# Patient Record
Sex: FEMALE | Race: WHITE | NOT HISPANIC OR LATINO | URBAN - METROPOLITAN AREA
[De-identification: names, ages, dates, MRNs, and addresses within clinical notes are randomized per-mention and may not be internally consistent; named-entity substitution may affect disease eponyms.]

---

## 2020-02-28 ENCOUNTER — OFFICE (OUTPATIENT)
Dept: URBAN - METROPOLITAN AREA CLINIC 33 | Facility: CLINIC | Age: 65
End: 2020-02-28

## 2020-02-28 ENCOUNTER — OFFICE (OUTPATIENT)
Dept: URBAN - METROPOLITAN AREA CLINIC 33 | Facility: CLINIC | Age: 65
End: 2020-02-28
Payer: COMMERCIAL

## 2020-02-28 VITALS
DIASTOLIC BLOOD PRESSURE: 71 MMHG | WEIGHT: 172.8 LBS | SYSTOLIC BLOOD PRESSURE: 97 MMHG | HEIGHT: 64 IN | HEART RATE: 72 BPM | TEMPERATURE: 96.8 F

## 2020-02-28 DIAGNOSIS — I25.10 ATHEROSCLEROTIC HEART DISEASE OF NATIVE CORONARY ARTERY WITH: ICD-10-CM

## 2020-02-28 DIAGNOSIS — Z86.010 PERSONAL HISTORY OF COLONIC POLYPS: ICD-10-CM

## 2020-02-28 DIAGNOSIS — K59.09 OTHER CONSTIPATION: ICD-10-CM

## 2020-02-28 DIAGNOSIS — G35 MULTIPLE SCLEROSIS: ICD-10-CM

## 2020-02-28 PROCEDURE — 99203 OFFICE O/P NEW LOW 30 MIN: CPT

## 2020-02-28 PROCEDURE — 00031: CPT

## 2020-02-28 NOTE — SERVICEHPINOTES
ELAINE MUNOZ   is a   64   year old    female who is being seen in consultation at the request of   SANCHEZ ALTMAN   for OV prior to colonoscopy.  Last colonoscopy in 2013 with 6mm adenomatous polyp ([place]). Overall, she is doing well. She is currently on Augagio for her MS (dx in 95). She still gets occasional reflux if she drinks too much coffee but otherwise none. BMs multiple times throughout the day, but small amts BSS type 4. Is on anti-inflammatory diet which seems to help. She does have some pelvic floor issues and went to pelvic floor PT maybe 8 years ago which did help. No family hx of colon cancer father has had multiple polyps. Son has UC. S/p MI in 2003 and has stent.  Had heart monitor and stress test last year which was reportedly normal. was released by cardiology. Denies chest pain, SOB, and palpitations.BRFONT style="BACKGROUND-COLOR: #ffffcc" visited="true"BRS/FONT

## 2020-06-23 ENCOUNTER — ON CAMPUS - OUTPATIENT (OUTPATIENT)
Dept: URBAN - METROPOLITAN AREA HOSPITAL 37 | Facility: HOSPITAL | Age: 65
End: 2020-06-23
Payer: COMMERCIAL

## 2020-06-23 DIAGNOSIS — D12.4 BENIGN NEOPLASM OF DESCENDING COLON: ICD-10-CM

## 2020-06-23 DIAGNOSIS — K63.5 POLYP OF COLON: ICD-10-CM

## 2020-06-23 DIAGNOSIS — Z86.010 PERSONAL HISTORY OF COLONIC POLYPS: ICD-10-CM

## 2020-06-23 PROCEDURE — 45380 COLONOSCOPY AND BIOPSY: CPT | Mod: PT | Performed by: INTERNAL MEDICINE

## 2021-03-04 ENCOUNTER — VIRTUAL VISIT (OUTPATIENT)
Dept: INTERNAL MEDICINE CLINIC | Age: 66
End: 2021-03-04
Payer: MEDICARE

## 2021-03-04 DIAGNOSIS — F33.41 RECURRENT MAJOR DEPRESSIVE DISORDER, IN PARTIAL REMISSION (HCC): ICD-10-CM

## 2021-03-04 DIAGNOSIS — N95.9 UNSPECIFIED MENOPAUSAL AND PERIMENOPAUSAL DISORDER: ICD-10-CM

## 2021-03-04 DIAGNOSIS — I25.119 ATHEROSCLEROSIS OF NATIVE CORONARY ARTERY OF NATIVE HEART WITH ANGINA PECTORIS (HCC): Primary | ICD-10-CM

## 2021-03-04 DIAGNOSIS — G35 MULTIPLE SCLEROSIS, RELAPSING-REMITTING (HCC): ICD-10-CM

## 2021-03-04 DIAGNOSIS — K21.9 GASTROESOPHAGEAL REFLUX DISEASE WITHOUT ESOPHAGITIS: ICD-10-CM

## 2021-03-04 DIAGNOSIS — Z80.49 FAMILY HISTORY OF CERVICAL CANCER: ICD-10-CM

## 2021-03-04 DIAGNOSIS — J45.20 MILD INTERMITTENT ASTHMA WITHOUT COMPLICATION: ICD-10-CM

## 2021-03-04 DIAGNOSIS — F41.9 ANXIETY: ICD-10-CM

## 2021-03-04 DIAGNOSIS — Z12.31 BREAST CANCER SCREENING BY MAMMOGRAM: ICD-10-CM

## 2021-03-04 DIAGNOSIS — E78.5 HYPERLIPIDEMIA, UNSPECIFIED HYPERLIPIDEMIA TYPE: ICD-10-CM

## 2021-03-04 DIAGNOSIS — Z13.820 SCREENING FOR OSTEOPOROSIS: ICD-10-CM

## 2021-03-04 DIAGNOSIS — Z80.3 FAMILY HISTORY OF BREAST CANCER: ICD-10-CM

## 2021-03-04 DIAGNOSIS — K58.9 IRRITABLE BOWEL SYNDROME WITHOUT DIARRHEA: ICD-10-CM

## 2021-03-04 PROBLEM — I25.10 ATHEROSCLEROSIS OF CORONARY ARTERY: Status: ACTIVE | Noted: 2017-10-23

## 2021-03-04 PROBLEM — G43.909 MIGRAINE HEADACHE: Status: ACTIVE | Noted: 2021-03-04

## 2021-03-04 PROBLEM — E66.3 OVERWEIGHT: Status: ACTIVE | Noted: 2020-09-23

## 2021-03-04 PROBLEM — F32.A DEPRESSION: Status: ACTIVE | Noted: 2017-10-23

## 2021-03-04 PROCEDURE — 3017F COLORECTAL CA SCREEN DOC REV: CPT | Performed by: PHYSICIAN ASSISTANT

## 2021-03-04 PROCEDURE — G9899 SCRN MAM PERF RSLTS DOC: HCPCS | Performed by: PHYSICIAN ASSISTANT

## 2021-03-04 PROCEDURE — 99999 PR OFFICE/OUTPT VISIT,PROCEDURE ONLY: CPT | Performed by: PHYSICIAN ASSISTANT

## 2021-03-04 PROCEDURE — G8427 DOCREV CUR MEDS BY ELIG CLIN: HCPCS | Performed by: PHYSICIAN ASSISTANT

## 2021-03-04 PROCEDURE — G9717 DOC PT DX DEP/BP F/U NT REQ: HCPCS | Performed by: PHYSICIAN ASSISTANT

## 2021-03-04 PROCEDURE — 99204 OFFICE O/P NEW MOD 45 MIN: CPT | Performed by: PHYSICIAN ASSISTANT

## 2021-03-04 RX ORDER — MONTELUKAST SODIUM 10 MG/1
TABLET ORAL
COMMUNITY
Start: 2020-12-28 | End: 2021-03-29 | Stop reason: SDUPTHER

## 2021-03-04 RX ORDER — ATORVASTATIN CALCIUM 40 MG/1
40 TABLET, FILM COATED ORAL DAILY
COMMUNITY
Start: 2020-09-23 | End: 2021-08-20 | Stop reason: DRUGHIGH

## 2021-03-04 RX ORDER — ALBUTEROL SULFATE 0.83 MG/ML
2.5 SOLUTION RESPIRATORY (INHALATION)
COMMUNITY
Start: 2020-03-23 | End: 2021-03-23

## 2021-03-04 RX ORDER — CITALOPRAM 20 MG/1
20 TABLET, FILM COATED ORAL DAILY
COMMUNITY
Start: 2020-09-23 | End: 2021-08-27 | Stop reason: SDUPTHER

## 2021-03-04 RX ORDER — BUPROPION HYDROCHLORIDE 300 MG/1
300 TABLET ORAL EVERY MORNING
COMMUNITY
Start: 2020-09-23 | End: 2021-06-04 | Stop reason: SDUPTHER

## 2021-03-04 RX ORDER — MOMETASONE FUROATE 100 UG/1
AEROSOL RESPIRATORY (INHALATION)
COMMUNITY
Start: 2021-02-23

## 2021-03-04 RX ORDER — FLUTICASONE PROPIONATE 50 MCG
SPRAY, SUSPENSION (ML) NASAL
COMMUNITY
Start: 2020-04-03 | End: 2021-06-04 | Stop reason: SDUPTHER

## 2021-03-04 NOTE — PROGRESS NOTES
Channing Qiu  Identified pt with two pt identifiers(name and ). Chief Complaint   Patient presents with   [address]     Former PCP - Dr. Ha Blas record In preparation for visit and have obtained necessary documentation. 1. Have you been to the ER, urgent care clinic or hospitalized since your last visit? Yes. [facility]     2. Have you seen or consulted any other health care providers outside of the [address] since your last visit? Include any pap smears or colon screening. Yes. PAP - Dr. Yuliya Law on 2020 [id number]  [facility]  Colonoscopy - [facility] in [place], [place]     Patient has an advance directive. Vitals reviewed with provider. Health Maintenance reviewed:     Health Maintenance Due   Topic    Hepatitis C Screening     Lipid Screen     PAP AKA CERVICAL CYTOLOGY     Shingrix Vaccine Age 49> (1 of 2)    Colorectal Cancer Screening Combo     Breast Cancer Screen Mammogram           Wt Readings from Last 3 Encounters:   No data found for Wt        Temp Readings from Last 3 Encounters:   No data found for Temp        BP Readings from Last 3 Encounters:   No data found for BP        Pulse Readings from Last 3 Encounters:   No data found for Pulse      There were no vitals filed for this visit.        Learning Assessment:   :       Learning Assessment 3/4/2021   PRIMARY LEARNER Patient   HIGHEST LEVEL OF EDUCATION - PRIMARY LEARNER  2 YEARS OF COLLEGE   BARRIERS PRIMARY LEARNER NONE   PRIMARY LANGUAGE ENGLISH   LEARNER PREFERENCE PRIMARY READING   ANSWERED BY Patient   RELATIONSHIP SELF        Depression Screening:   :       3 most recent PHQ Screens 3/4/2021   Little interest or pleasure in doing things Nearly every day   Feeling down, depressed, irritable, or hopeless Nearly every day   Total Score PHQ 2 6        Fall Risk Assessment:   :       Fall Risk Assessment, last 12 mths 3/4/2021   Able to walk? Yes   Fall in past 12 months? 0        Abuse Screening:   :       Abuse Screening Questionnaire 3/4/2021   Do you ever feel afraid of your partner? N   Are you in a relationship with someone who physically or mentally threatens you? N   Is it safe for you to go home?  Y        ADL Screening:   :       ADL Assessment 3/4/2021   Feeding yourself No Help Needed   Getting from bed to chair No Help Needed   Getting dressed No Help Needed   Bathing or showering No Help Needed   Walk across the room (includes cane/walker) No Help Needed   Using the telphone No Help Needed   Taking your medications No Help Needed   Preparing meals No Help Needed   Managing money (expenses/bills) No Help Needed   Moderately strenuous housework (laundry) No Help Needed   Shopping for personal items (toiletries/medicines) No Help Needed   Shopping for groceries No Help Needed   Driving No Help Needed   Climbing a flight of stairs No Help Needed   Getting to places beyond walking distances No Help Needed

## 2021-03-04 NOTE — PROGRESS NOTES
Alta Zepeda is a 60 y.o. female who was seen by synchronous (real-time) audio-video technology on 3/4/2021 for Establish Care (Former PCP - Dr. Yo Barnett) and Medication Refill        Assessment & Plan:   Diagnoses and all orders for this visit:    1. Atherosclerosis of native coronary artery of native heart with angina pectoris ([facility] [address]  -     LIPID PANEL; Future  -     VITAMIN D, 25 HYDROXY; Future  -     TSH 3RD GENERATION; Future  -     METABOLIC PANEL, COMPREHENSIVE; Future  No longer followed by Cardiology. Hx of 1 Drug alluding stent placed. No angina. On Statin with good LDL control  2. Gastroesophageal reflux disease without esophagitis  Diet Controlled  3. Irritable bowel syndrome without diarrhea  -     TSH 3RD GENERATION; Future  -     METABOLIC PANEL, COMPREHENSIVE; Future  High fiber diet, controlled. Hx of hemorrhoids secondary to constipation  4. [facility] sclerosis, rel[facility]remitting [facility]   Followed by Neurology. MRI twice yearly. Last relapse 2010. Takes Aubagio daily for control. Has chronic symptoms of muscle weakness from last relapse  5. Anxiety  -     TSH 3RD GENERATION; Future  -     METABOLIC PANEL, COMPREHENSIVE; Future   Well controlled at this time. Improving with more outdoor activities and friends in this area from her move. 6. Recurrent major depressive disorder, in partial remission ([facility] [address]  -     TSH 3RD GENERATION; Future  -     METABOLIC PANEL, COMPREHENSIVE; Future   Hx of violent trauma in her past with PTSD. Has seen counseling and therapy for this and has done well. Had bad few weeks of depression when weather turned, but now improvng.   7. Family history of breast cancer  Mother, gets yearly mammograms  8. Family history of cervical cancer   Mother. Last pap 2020, normal  9. Hyperlipidemia, unspecified hyperlipidemia type  -     LIPID PANEL; Future  -     METABOLIC PANEL, COMPREHENSIVE; Future  Last LDL Feb 2020, at [place];  10.  Breast cancer screening by mammogram  -     [facility] FADIA W MAMMO BI SCREENING INCL CAD; Future    11. Screening for osteoporosis  -     DEXA BONE DENSITY STUDY AXIAL; Future  -     VITAMIN D, 25 HYDROXY; Future   Last done in 2005 or so per pt   12. Unspecified menopausal and perimenopausal disorder   -     DEXA BONE DENSITY STUDY AXIAL; Future  -     VITAMIN D, 25 HYDROXY; Future        I spent at least 50 minutes on this visit with this new patient. 712  Subjective:     Has a hx of HLD. Hx of MI in 2003. Had 98% blockage in LAD. Had 1 stent placed. Has not needed nitro since 2003. Has been released from Cardiologist.  Last LDL: 98 in February 2020. HDL 53. Triglyceride   Normal BP's     Hx of migraine headaches. None since having heart attack. Hx of Multiple Sclerosis. Diagnosis in 1995. Relapsing-remitting. Was in a clinical trial in [facility]. Had been in remission since 2010. See's Dr. Juan José Lombardi [facility]) see's him every 3 months. Gets MRI every 6 months. Takes Aubagio. R arm and R hand and R lower face have minimal weakness. L leg is significantly weaker than R. Neuropathy in both hands. All gets worse if over exerted. Hx of asthma. Asthmanex. Has not needed nebulizer. Tends to seasonal. Grass, mold, tree pollen triggers. Never hospitalized. Bad 2020. Hx of major depression. Worst in winter     Mother had hx of cervical cancer, breast cancer, lung cancer. Had pap smear in 2020. Normal.  Hx of dense breast. Mammogram 2020 Jan.    Colonoscopy in 2020. Hx of polyps. Benign, adenoma polyps. Due in 5 years. Hx of IBS-C. Use high fiber diet. GERD: Diet controlled. Acts up if not eating right. Has a hx of recurring hemorrhoid. Needed to have surgery in 2009. Diet:  Eats by auto-immune protocol. Avoids legumes and soy. Fiber supplement. Exercise: Walks almost 2 miles a day. Prior to Admission medications    Medication Sig Start Date End Date Taking?  Authorizing Provider   citalopram (CELEXA) 20 mg tablet Take 20 mg by mouth daily. 9/23/20  Yes Provider, Historical   buPROPion XL (WELLBUTRIN XL) 300 mg XL tablet Take 300 mg by mouth Every morning. 9/23/20  Yes Provider, Historical   montelukast (SINGULAIR) 10 mg tablet TAKE 1 TABLET BY MOUTH ONCE DAILY 12/28/20  Yes Provider, Historical   atorvastatin (LIPITOR) 40 mg tablet Take 40 mg by mouth daily. 9/23/20  Yes Provider, Historical   fluticasone propionate (FLONASE) 50 mcg/actuation nasal spray SPRAY 2 SPRAYS INTO EACH NOSTRIL EVERY DAY 4/3/20  Yes Provider, Historical   Asmanex  mcg/actuation HFAA inhaler INHALE 1 PUFF BY MOUTH TWICE DAILY 2/23/21  Yes Provider, Historical   albuterol (PROVENTIL VENTOLIN) 2.5 mg /3 mL (0.083 %) nebu 2.5 mg every six (6) hours as needed. 3/23/20 3/23/21 Yes Provider, Historical   teriflunomide (AUBAGIO) 14 mg tablet Take 1 Tab by mouth daily.    Yes Provider, Historical     Patient Active Problem List   Diagnosis Code    Anxiety F41.9    Atherosclerosis of coronary artery I25.10    Depression F32.9    Family history of breast cancer Z80.3    Family history of cervical cancer Z80.49    GERD (gastroesophageal reflux disease) K21.9    IBS (irritable bowel syndrome) K58.9    Migraine headache G43.909    Hyperlipidemia E78.5    Multiple sclerosis, relapsing-remitting ([facility] G35    Overweight E66.3    Recurrent major depressive disorder, in partial remission ([facility] F33.41     Patient Active Problem List    Diagnosis Date Noted    Migraine headache 03/04/2021    Hyperlipidemia 03/04/2021    GERD (gastroesophageal reflux disease) 10/05/2020    Overweight 09/23/2020    Family history of breast cancer 02/19/2020    Family history of cervical cancer 12/18/2018    Recurrent major depressive disorder, in partial remission ([facility] 12/02/2018    Anxiety 10/23/2017    Atherosclerosis of coronary artery 10/23/2017    Depression 10/23/2017    IBS (irritable bowel syndrome) 10/23/2017    [facility], [facility]-remitting [facility] 10/23/2017     Current Outpatient Medications   Medication Sig Dispense Refill    citalopram (CELEXA) 20 mg tablet Take 20 mg by mouth daily.  buPROPion XL (WELLBUTRIN XL) 300 mg XL tablet Take 300 mg by mouth Every morning.  montelukast (SINGULAIR) 10 mg tablet TAKE 1 TABLET BY MOUTH ONCE DAILY      atorvastatin (LIPITOR) 40 mg tablet Take 40 mg by mouth daily.  fluticasone propionate (FLONASE) 50 mcg/actuation nasal spray SPRAY 2 SPRAYS INTO EACH NOSTRIL EVERY DAY      Asmanex  mcg/actuation HFAA inhaler INHALE 1 PUFF BY MOUTH TWICE DAILY      albuterol (PROVENTIL VENTOLIN) 2.5 mg /3 mL (0.083 %) nebu 2.5 mg every six (6) hours as needed.  teriflunomide (AUBAGIO) 14 mg tablet Take 1 Tab by mouth daily. Allergies   Allergen Reactions    Opioids-Methadone And Related Other (comments)     Highly sensitive     History reviewed. No pertinent past medical history. History reviewed. No pertinent surgical history. Family History   Problem Relation Age of Onset    Cancer Mother         Cervical, Lung and Breast    Heart Disease Father      Social History     Tobacco Use    Smoking status: Former Smoker     Types: Cigarettes    Smokeless tobacco: Never Used   Substance Use Topics    Alcohol use: Not Currently     Frequency: Never       ROS    Objective:     Patient-Reported Vitals 3/4/2021   Patient-Reported Weight 170lb      General: alert, cooperative, no distress   Mental  status: normal mood, behavior, speech, dress, motor activity, and thought processes, able to follow commands   HENT: NCAT   Neck: no visualized mass   Resp: no respiratory distress   Neuro: no gross deficits   Skin: no discoloration or lesions of concern on visible areas   Psychiatric: normal affect, consistent with stated mood, no evidence of hallucinations     Additional exam findings:        We discussed the expected course, resolution and complications of the diagnosis(es) in detail. Medication risks, benefits, costs, interactions, and alternatives were discussed as indicated. I advised her to contact the office if her condition worsens, changes or fails to improve as anticipated. She expressed understanding with the diagnosis(es) and plan. Corrie Oj, was evaluated through a synchronous (real-time) audio-video encounter. The patient (or guardian if applicable) is aware that this is a billable service. Verbal consent to proceed has been obtained within the past 12 months. The visit was conducted pursuant to the emergency declaration under the [address] [place] [address], [address] [place] [address] authority and the [employer] General Act. Patient identification was verified, and a caregiver was present when appropriate. The patient was located in a state where the provider was credentialed to provide care.     Gina Washington PA-C

## 2021-03-08 ENCOUNTER — HOSPITAL ENCOUNTER (OUTPATIENT)
Dept: MAMMOGRAPHY | Age: 66
Discharge: HOME | End: 2021-03-08
Attending: PHYSICIAN ASSISTANT
Payer: MEDICARE

## 2021-03-08 ENCOUNTER — HOSPITAL ENCOUNTER (OUTPATIENT)
Dept: BONE DENSITY | Age: 66
Discharge: HOME | End: 2021-03-08
Attending: PHYSICIAN ASSISTANT
Payer: MEDICARE

## 2021-03-08 DIAGNOSIS — M85.80 OSTEOPENIA AFTER MENOPAUSE: Primary | ICD-10-CM

## 2021-03-08 DIAGNOSIS — Z12.31 BREAST CANCER SCREENING BY MAMMOGRAM: ICD-10-CM

## 2021-03-08 DIAGNOSIS — N95.9 UNSPECIFIED MENOPAUSAL AND PERIMENOPAUSAL DISORDER: ICD-10-CM

## 2021-03-08 DIAGNOSIS — Z78.0 OSTEOPENIA AFTER MENOPAUSE: Primary | ICD-10-CM

## 2021-03-08 DIAGNOSIS — Z13.820 SCREENING FOR OSTEOPOROSIS: ICD-10-CM

## 2021-03-08 PROCEDURE — 77063 BREAST TOMOSYNTHESIS BI: CPT

## 2021-03-08 PROCEDURE — 77080 DXA BONE DENSITY AXIAL: CPT

## 2021-03-08 RX ORDER — SPIRONOLACTONE 25 MG
1 TABLET ORAL DAILY
Qty: 90 TAB | Refills: 3 | Status: SHIPPED | OUTPATIENT
Start: 2021-03-08

## 2021-03-08 NOTE — PROGRESS NOTES
Verified two patient identifiers, name and . Pt notified of results, she uses the [facility] in [place] on [address]. Pt had no further questions at this time.

## 2021-03-08 NOTE — PROGRESS NOTES
Ryan Dodson,   Your DEXA scan showed diffuse osteopenia. This is not total bone density loss but it is the start of it and if unchecked can lead to osteoporosis. I want you to begin taking a vitamin D and calcium supplement every day. I will call in the one to your pharmacy you should take.

## 2021-03-29 RX ORDER — MONTELUKAST SODIUM 10 MG/1
TABLET ORAL
Qty: 90 TAB | Refills: 3 | Status: SHIPPED | OUTPATIENT
Start: 2021-03-29 | End: 2021-10-21

## 2021-03-29 NOTE — TELEPHONE ENCOUNTER
Requested Prescriptions     Pending Prescriptions Disp Refills    montelukast (SINGULAIR) 10 mg tablet

## 2021-03-29 NOTE — TELEPHONE ENCOUNTER
PCP: Mary Douglas PA-C     Last appt: 3/4/2021   No future appointments.      Requested Prescriptions     Pending Prescriptions Disp Refills    montelukast (SINGULAIR) 10 mg tablet 90 Tab 3     Sig: TAKE 1 TABLET BY MOUTH ONCE DAILY

## 2021-04-13 LAB
25(OH)D3+25(OH)D2 SERPL-MCNC: 48.7 NG/ML (ref 30–100)
ALBUMIN SERPL-MCNC: 4.4 G/DL (ref 3.8–4.8)
ALBUMIN/GLOB SERPL: 2 {RATIO} (ref 1.2–2.2)
ALP SERPL-CCNC: 114 IU/L (ref 39–117)
ALT SERPL-CCNC: 20 IU/L (ref 0–32)
AST SERPL-CCNC: 19 IU/L (ref 0–40)
BILIRUB SERPL-MCNC: 0.4 MG/DL (ref 0–1.2)
BUN SERPL-MCNC: 10 MG/DL (ref 8–27)
BUN/CREAT SERPL: 14 (ref 12–28)
CALCIUM SERPL-MCNC: 9.8 MG/DL (ref 8.7–10.3)
CHLORIDE SERPL-SCNC: 103 MMOL/L (ref 96–106)
CHOLEST SERPL-MCNC: 149 MG/DL (ref 100–199)
CO2 SERPL-SCNC: 23 MMOL/L (ref 20–29)
CREAT SERPL-MCNC: 0.69 MG/DL (ref 0.57–1)
GLOBULIN SER CALC-MCNC: 2.2 G/DL (ref 1.5–4.5)
GLUCOSE SERPL-MCNC: 81 MG/DL (ref 65–99)
HDLC SERPL-MCNC: 51 MG/DL
LDLC SERPL CALC-MCNC: 78 MG/DL (ref 0–99)
POTASSIUM SERPL-SCNC: 4.4 MMOL/L (ref 3.5–5.2)
PROT SERPL-MCNC: 6.6 G/DL (ref 6–8.5)
SODIUM SERPL-SCNC: 140 MMOL/L (ref 134–144)
TRIGL SERPL-MCNC: 112 MG/DL (ref 0–149)
TSH SERPL DL<=0.005 MIU/L-ACNC: 3.41 UIU/ML (ref 0.45–4.5)
VLDLC SERPL CALC-MCNC: 20 MG/DL (ref 5–40)

## 2021-06-04 RX ORDER — BUPROPION HYDROCHLORIDE 300 MG/1
300 TABLET ORAL EVERY MORNING
Qty: 30 TABLET | Refills: 1 | Status: SHIPPED | OUTPATIENT
Start: 2021-06-04 | End: 2021-08-20 | Stop reason: SDUPTHER

## 2021-06-04 RX ORDER — FLUTICASONE PROPIONATE 50 MCG
SPRAY, SUSPENSION (ML) NASAL
Qty: 1 BOTTLE | Refills: 1 | Status: SHIPPED | OUTPATIENT
Start: 2021-06-04 | End: 2022-05-23

## 2021-06-04 NOTE — TELEPHONE ENCOUNTER
Requested Prescriptions     Pending Prescriptions Disp Refills    fluticasone propionate (FLONASE) 50 mcg/actuation nasal spray 1 Bottle

## 2021-06-04 NOTE — TELEPHONE ENCOUNTER
Requested Prescriptions     Pending Prescriptions Disp Refills    fluticasone propionate (FLONASE) 50 mcg/actuation nasal spray 1 Bottle     buPROPion XL (WELLBUTRIN XL) 300 mg XL tablet       Sig: Take 1 Tablet by mouth Every morning.

## 2021-06-04 NOTE — TELEPHONE ENCOUNTER
PCP: Adal Livingston PA-C     Last appt: 3/4/2021   No future appointments. Requested Prescriptions     Pending Prescriptions Disp Refills    fluticasone propionate (FLONASE) 50 mcg/actuation nasal spray 1 Bottle     buPROPion XL (WELLBUTRIN XL) 300 mg XL tablet       Sig: Take 1 Tablet by mouth Every morning.

## 2021-08-19 NOTE — PATIENT INSTRUCTIONS
Medicare Wellness Visit, Female     The best way to live healthy is to have a lifestyle where you eat a well-balanced diet, exercise regularly, limit alcohol use, and quit all forms of tobacco/nicotine, if applicable. Regular preventive services are another way to keep healthy. Preventive services (vaccines, screening tests, monitoring & exams) can help personalize your care plan, which helps you manage your own care. Screening tests can find health problems at the earliest stages, when they are easiest to treat. [facility] follows the current, evidence-based guidelines published by the [facility] ([facility]) when recommending preventive services for our patients. Because we follow these guidelines, sometimes recommendations change over time as research supports it. (For example, mammograms used to be recommended annually. Even though Medicare will still pay for an annual mammogram, the newer guidelines recommend a mammogram every two years for women of average risk). Of course, you and your doctor may decide to screen more often for some diseases, based on your risk and your co-morbidities (chronic disease you are already diagnosed with). Preventive services for you include:  - Medicare offers their members a free annual wellness visit, which is time for you and your primary care provider to discuss and plan for your preventive service needs. Take advantage of this benefit every year!  -All adults over the age of 72 should receive the recommended pneumonia vaccines. Current USPSTF guidelines recommend a series of two vaccines for the best pneumonia protection.   -All adults should have a flu vaccine yearly and a tetanus vaccine every 10 years.   -All adults age 48 and older should receive the shingles vaccines (series of two vaccines).       -All adults age 38-68 who are overweight should have a diabetes screening test once every three years.   -All adults born between [birth year] and 1964 should be screened once for Hepatitis C.  -Other screening tests and preventive services for persons with diabetes include: an eye exam to screen for diabetic retinopathy, a kidney function test, a foot exam, and stricter control over your cholesterol.   -Cardiovascular screening for adults with routine risk involves an electrocardiogram (ECG) at intervals determined by your doctor.   -Colorectal cancer screenings should be done for adults age 54-65 with no increased risk factors for colorectal cancer. There are a number of acceptable methods of screening for this type of cancer. Each test has its own benefits and drawbacks. Discuss with your doctor what is most appropriate for you during your annual wellness visit. The different tests include: colonoscopy (considered the best screening method), a fecal occult blood test, a fecal DNA test, and sigmoidoscopy.    -A bone mass density test is recommended when a woman turns 65 to screen for osteoporosis. This test is only recommended one time, as a screening. Some providers will use this same test as a disease monitoring tool if you already have osteoporosis. -Breast cancer screenings are recommended every other year for women of normal risk, age 54-69.  -Cervical cancer screenings for women over age 72 are only recommended with certain risk factors.      Here is a list of your current Health Maintenance items (your personalized list of preventive services) with a due date:  Health Maintenance Due   Topic Date Due    Hepatitis C Test  Never done    Pap Test  Never done    Colorectal Screening  Never done    Annual Well Visit  Never done

## 2021-08-19 NOTE — PROGRESS NOTES
This is the Subsequent Medicare Annual Wellness Exam, performed 12 months or more after the Initial AWV or the last Subsequent AWV    I have reviewed the patient's medical history in detail and updated the computerized patient record. Assessment/Plan   Education and counseling provided:  Are appropriate based on today's review and evaluation  End-of-Life planning (with patient's consent)    1. Medicare annual wellness visit, subsequent       Depression Risk Factor Screening     3 most recent PHQ Screens 3/4/2021   Little interest or pleasure in doing things Nearly every day   Feeling down, depressed, irritable, or hopeless Nearly every day   Total Score PHQ 2 6       Alcohol Risk Screen    Do you average more than 1 drink per night or more than 7 drinks a week:  No    On any one occasion in the past three months have you have had more than 3 drinks containing alcohol:  No        Functional Ability and Level of Safety    Hearing: Has had hearing evaluation last year, L ear with some hearing loss, within norm      Activities of Daily Living: The home contains: handrails, grab bars and rugs  Patient does total self care      Ambulation: with difficulty, uses a cane     Fall Risk:  Fall Risk Assessment, last 12 mths 3/4/2021   Able to walk? Yes   Fall in past 12 months?  0      Abuse Screen:  Patient is not abused       Cognitive Screening    Has your family/caregiver stated any concerns about your memory: no     Cognitive Screening: Normal - Animal Naming Test    Health Maintenance Due     Health Maintenance Due   Topic Date Due    Hepatitis C Screening  Never done    PAP AKA CERVICAL CYTOLOGY  Never done    Colorectal Cancer Screening Combo  Never done    Medicare Yearly Exam  Never done       Patient Care Team   Patient Care Team:  Sruthi Osborne PA-C as PCP - General (Physician Assistant)  Sruthi Osborne PA-C as PCP - [facility] Erickson Henry Provider    History     Patient Active Problem List   Diagnosis Code    Anxiety F41.9    Atherosclerosis of coronary artery I25.10    Depression F32.9    Family history of breast cancer Z80.3    Family history of cervical cancer Z80.49    GERD (gastroesophageal reflux disease) K21.9    IBS (irritable bowel syndrome) K58.9    Migraine headache G43.909    Hyperlipidemia E78.5    Multiple sclerosis, relapsing-remitting (HCC) G35    Overweight E66.3    Recurrent major depressive disorder, in partial remission (HCC) F33.41    Mild intermittent asthma without complication F11.72     No past medical history on file. No past surgical history on file. Current Outpatient Medications   Medication Sig Dispense Refill    fluticasone propionate (FLONASE) 50 mcg/actuation nasal spray SPRAY 2 SPRAYS INTO EACH NOSTRIL EVERY DAY 1 Bottle 1    buPROPion XL (WELLBUTRIN XL) 300 mg XL tablet Take 1 Tablet by mouth Every morning. 30 Tablet 1    montelukast (SINGULAIR) 10 mg tablet TAKE 1 TABLET BY MOUTH ONCE DAILY 90 Tab 3    Calcium-Cholecalciferol, D3, (Calcium 600 with Vitamin D3) 600 mg(1,500mg) -400 unit chew Take 1 Tab by mouth daily. 90 Tab 3    citalopram (CELEXA) 20 mg tablet Take 20 mg by mouth daily.  atorvastatin (LIPITOR) 40 mg tablet Take 40 mg by mouth daily.  Asmanex  mcg/actuation HFAA inhaler INHALE 1 PUFF BY MOUTH TWICE DAILY      teriflunomide (AUBAGIO) 14 mg tablet Take 1 Tab by mouth daily.        Allergies   Allergen Reactions    Opioids-Methadone And Related Other (comments)     Highly sensitive       Family History   Problem Relation Age of Onset    Cancer Mother         Cervical, Lung and Breast    Breast Cancer Mother     Heart Disease Father      Social History     Tobacco Use    Smoking status: Former Smoker     Types: Cigarettes    Smokeless tobacco: Never Used   Substance Use Topics    Alcohol use: Not Currently         Lauren Son PA-C

## 2021-08-20 ENCOUNTER — OFFICE VISIT (OUTPATIENT)
Dept: INTERNAL MEDICINE CLINIC | Age: 66
End: 2021-08-20
Payer: MEDICARE

## 2021-08-20 VITALS
HEART RATE: 77 BPM | TEMPERATURE: 98.4 F | SYSTOLIC BLOOD PRESSURE: 110 MMHG | DIASTOLIC BLOOD PRESSURE: 75 MMHG | RESPIRATION RATE: 12 BRPM | BODY MASS INDEX: 29.19 KG/M2 | WEIGHT: 171 LBS | HEIGHT: 64 IN | OXYGEN SATURATION: 95 %

## 2021-08-20 DIAGNOSIS — F33.41 RECURRENT MAJOR DEPRESSIVE DISORDER, IN PARTIAL REMISSION (HCC): ICD-10-CM

## 2021-08-20 DIAGNOSIS — I25.10 ATHEROSCLEROSIS OF NATIVE CORONARY ARTERY OF NATIVE HEART WITHOUT ANGINA PECTORIS: ICD-10-CM

## 2021-08-20 DIAGNOSIS — E66.3 OVERWEIGHT: Primary | ICD-10-CM

## 2021-08-20 DIAGNOSIS — G35 MULTIPLE SCLEROSIS, RELAPSING-REMITTING (HCC): ICD-10-CM

## 2021-08-20 DIAGNOSIS — Z00.00 MEDICARE ANNUAL WELLNESS VISIT, SUBSEQUENT: Primary | ICD-10-CM

## 2021-08-20 DIAGNOSIS — J45.20 MILD INTERMITTENT ASTHMA WITHOUT COMPLICATION: ICD-10-CM

## 2021-08-20 PROCEDURE — G9899 SCRN MAM PERF RSLTS DOC: HCPCS | Performed by: PHYSICIAN ASSISTANT

## 2021-08-20 PROCEDURE — G8419 CALC BMI OUT NRM PARAM NOF/U: HCPCS | Performed by: PHYSICIAN ASSISTANT

## 2021-08-20 PROCEDURE — G0439 PPPS, SUBSEQ VISIT: HCPCS | Performed by: PHYSICIAN ASSISTANT

## 2021-08-20 PROCEDURE — 3017F COLORECTAL CA SCREEN DOC REV: CPT | Performed by: PHYSICIAN ASSISTANT

## 2021-08-20 PROCEDURE — G8427 DOCREV CUR MEDS BY ELIG CLIN: HCPCS | Performed by: PHYSICIAN ASSISTANT

## 2021-08-20 PROCEDURE — G9717 DOC PT DX DEP/BP F/U NT REQ: HCPCS | Performed by: PHYSICIAN ASSISTANT

## 2021-08-20 RX ORDER — PROMETHAZINE HYDROCHLORIDE 25 MG/1
25 TABLET ORAL
Qty: 30 TABLET | Refills: 2 | Status: SHIPPED | OUTPATIENT
Start: 2021-08-20 | End: 2022-07-06

## 2021-08-20 RX ORDER — LEVOCETIRIZINE DIHYDROCHLORIDE 5 MG/1
TABLET, FILM COATED ORAL
COMMUNITY
End: 2022-06-07

## 2021-08-20 RX ORDER — MECLIZINE HYDROCHLORIDE 25 MG/1
12.5 TABLET ORAL
Qty: 30 TABLET | Refills: 0 | Status: SHIPPED | OUTPATIENT
Start: 2021-08-20 | End: 2021-09-09

## 2021-08-20 RX ORDER — MULTIVITAMIN WITH IRON
1 TABLET ORAL DAILY
COMMUNITY

## 2021-08-20 RX ORDER — BUPROPION HYDROCHLORIDE 300 MG/1
300 TABLET ORAL EVERY MORNING
Qty: 90 TABLET | Refills: 3 | Status: SHIPPED | OUTPATIENT
Start: 2021-08-20

## 2021-08-20 RX ORDER — ATORVASTATIN CALCIUM 20 MG/1
20 TABLET, FILM COATED ORAL DAILY
Qty: 90 TABLET | Refills: 3 | Status: SHIPPED | OUTPATIENT
Start: 2021-08-20 | End: 2022-09-06 | Stop reason: SDUPTHER

## 2021-08-20 NOTE — PROGRESS NOTES
Frank Saenz  Identified pt with two pt identifiers(name and ). Chief Complaint   Patient presents with   [facility] Wellness Visit       Reviewed record In preparation for visit and have obtained necessary documentation. Will bring a copy of advanced directive / living will. 1. Have you been to the ER, urgent care clinic or hospitalized since your last visit? No     2. Have you seen or consulted any other health care providers outside of the [address] [place] [address] since your last visit? Include any pap smears or colon screening. No    Vitals reviewed with provider. Health Maintenance reviewed: colonoscopy Dr Alfonso Machado in [address] [place] [address], has had a pap will call back with name. Health Maintenance Due   Topic    Hepatitis C Screening     PAP AKA CERVICAL CYTOLOGY     Colorectal Cancer Screening Combo     Medicare Yearly Exam           Wt Readings from Last 3 Encounters:   No data found for Wt        Temp Readings from Last 3 Encounters:   No data found for Temp        BP Readings from Last 3 Encounters:   No data found for BP        Pulse Readings from Last 3 Encounters:   No data found for Pulse      There were no vitals filed for this visit. Learning Assessment:   :       Learning Assessment 3/4/2021   PRIMARY LEARNER Patient   HIGHEST LEVEL OF EDUCATION - PRIMARY LEARNER  2 YEARS OF COLLEGE   BARRIERS PRIMARY LEARNER NONE   PRIMARY LANGUAGE ENGLISH   LEARNER PREFERENCE PRIMARY READING   ANSWERED BY Patient   RELATIONSHIP SELF        Depression Screening:   :       3 most recent PHQ Screens 2021   PHQ Not Done Active Diagnosis of Depression or Bipolar Disorder   Little interest or pleasure in doing things -   Feeling down, depressed, irritable, or hopeless -   Total Score PHQ 2 -        Fall Risk Assessment:   :       Fall Risk Assessment, last 12 mths 3/4/2021   Able to walk? Yes   Fall in past 12 months?  0        Abuse Screening:   :       Abuse Screening Questionnaire 3/4/2021   Do you ever feel afraid of your partner? N   Are you in a relationship with someone who physically or mentally threatens you? N   Is it safe for you to go home?  Y        ADL Screening:   :       ADL Assessment 8/20/2021   Feeding yourself No Help Needed   Getting from bed to chair No Help Needed   Getting dressed No Help Needed   Bathing or showering No Help Needed   Walk across the room (includes cane/walker) No Help Needed   Using the telphone No Help Needed   Taking your medications No Help Needed   Preparing meals No Help Needed   Managing money (expenses/bills) No Help Needed   Moderately strenuous housework (laundry) No Help Needed   Shopping for personal items (toiletries/medicines) No Help Needed   Shopping for groceries No Help Needed   Driving No Help Needed   Climbing a flight of stairs No Help Needed   Getting to places beyond walking distances No Help Needed

## 2021-08-20 NOTE — TELEPHONE ENCOUNTER
PCP: Jos Powell PA-C    Last appt: 8/20/2021  Future Appointments   Date Time Provider Clinton Meeks   2/22/2022  8:00 AM Jos Powell PA-C [facility] AMB       Requested Prescriptions     Pending Prescriptions Disp Refills    buPROPion XL (WELLBUTRIN XL) 300 mg XL tablet 30 Tablet 1     Sig: Take 1 Tablet by mouth Every morning.

## 2021-08-27 ENCOUNTER — PATIENT MESSAGE (OUTPATIENT)
Dept: INTERNAL MEDICINE CLINIC | Age: 66
End: 2021-08-27

## 2021-08-27 RX ORDER — CITALOPRAM 20 MG/1
20 TABLET, FILM COATED ORAL DAILY
Qty: 90 TABLET | Refills: 1 | Status: SHIPPED | OUTPATIENT
Start: 2021-08-27 | End: 2022-02-28 | Stop reason: SDUPTHER

## 2021-08-27 NOTE — TELEPHONE ENCOUNTER
From: Sanjuanita Coreas  To: [employer] [facility]  Sent: 8/27/2021 9:39 AM EDT  Subject: Prescription Question    Need refill on Citalopram 20mg to [facility].  [place]
PCP: Gregorio Gallegos PA-C     Last appt: 8/20/2021     Future Appointments   Date Time Provider Clinton Meeks   2/22/2022  8:00 AM Gregorio Gallegos PA-C [facility] AMB          Requested Prescriptions     Pending Prescriptions Disp Refills    citalopram (CELEXA) 20 mg tablet 90 Tablet 1     Sig: Take 1 Tablet by mouth daily.
19.09

## 2021-10-21 ENCOUNTER — VIRTUAL VISIT (OUTPATIENT)
Dept: INTERNAL MEDICINE CLINIC | Age: 66
End: 2021-10-21
Payer: MEDICARE

## 2021-10-21 VITALS — BODY MASS INDEX: 29.35 KG/M2 | HEIGHT: 64 IN

## 2021-10-21 DIAGNOSIS — G44.211 INTRACTABLE EPISODIC TENSION-TYPE HEADACHE: ICD-10-CM

## 2021-10-21 DIAGNOSIS — G35 MULTIPLE SCLEROSIS, RELAPSING-REMITTING (HCC): Primary | ICD-10-CM

## 2021-10-21 DIAGNOSIS — F33.41 RECURRENT MAJOR DEPRESSIVE DISORDER, IN PARTIAL REMISSION (HCC): ICD-10-CM

## 2021-10-21 PROCEDURE — 99213 OFFICE O/P EST LOW 20 MIN: CPT | Performed by: PHYSICIAN ASSISTANT

## 2021-10-21 PROCEDURE — 3017F COLORECTAL CA SCREEN DOC REV: CPT | Performed by: PHYSICIAN ASSISTANT

## 2021-10-21 PROCEDURE — G9899 SCRN MAM PERF RSLTS DOC: HCPCS | Performed by: PHYSICIAN ASSISTANT

## 2021-10-21 PROCEDURE — G8427 DOCREV CUR MEDS BY ELIG CLIN: HCPCS | Performed by: PHYSICIAN ASSISTANT

## 2021-10-21 PROCEDURE — G9717 DOC PT DX DEP/BP F/U NT REQ: HCPCS | Performed by: PHYSICIAN ASSISTANT

## 2021-10-21 RX ORDER — BUTALBITAL, ACETAMINOPHEN AND CAFFEINE 50; 325; 40 MG/1; MG/1; MG/1
1 TABLET ORAL
Qty: 60 TABLET | Refills: 2 | Status: SHIPPED | OUTPATIENT
Start: 2021-10-21 | End: 2022-07-06

## 2021-10-21 NOTE — PROGRESS NOTES
Cristy Machado is a 60 y.o. female who was seen by synchronous (real-time) audio-video technology on 10/21/2021 for Headache        Assessment & Plan:   Diagnoses and all orders for this visit:    1. Multiple sclerosis, relapsing-remitting (HCC)  Still in remission, MRI last week negative   2. Recurrent major depressive disorder, in partial remission ([address]  Slightly increased because she is not able to do anything or drive with the headache, overall mood is stable  3. Intractable episodic tension-type headache  -     butalbital-acetaminophen-caffeine (FIORICET, ESGIC) -40 mg per tablet; Take 1 Tablet by mouth every six (6) hours as needed for Headache. Continue PRN phenergan and meclizine, trial Fioricet for PRN headache relief. Discussed allergies as a cause, discussed tension HA and using heating pad at base of neck for potential muscle relaxation. The complexity of medical decision making for this visit is moderate     I spent at least 20 minutes on this visit with this established patient. 712  Subjective:   Patient evaluated today for intractable headache x 2 months. She notes MAURICE began on 8/17/21. She has had a headache that is around the circumference of her head. She has been trying phenergan and meclizine as needed for vestibular symptoms. She has a hx of similar in the past. In 2016 she reported a very similar headache that occurred from August to December. She had seen an ENT at that time with no findings. She saw her Neurologist 10/1/21 and had q 6mo MRI which was negative for any MS disease, tumors, vascular problems. He trialed pt on Nurtec, which did not help. She notes she gets the most relief from sleep and Aleve. She was told Imitrex likely set off her heart attack 20 years ago. Prior to Admission medications    Medication Sig Start Date End Date Taking? Authorizing Provider   citalopram (CELEXA) 20 mg tablet Take 1 Tablet by mouth daily.  8/27/21  Yes Andrés Moore PA-C levocetirizine (Xyzal) 5 mg tablet Take  by mouth. Yes Provider, Historical   multivitamin with iron tablet Take 1 Tablet by mouth daily. Yes Provider, Historical   promethazine (PHENERGAN) 25 mg tablet Take 1 Tablet by mouth every six (6) hours as needed for Nausea. 8/20/21  Yes Varun Hudson PA-C   atorvastatin (LIPITOR) 20 mg tablet Take 1 Tablet by mouth daily. 8/20/21  Yes Varun Hudson PA-C   buPROPion XL (WELLBUTRIN XL) 300 mg XL tablet Take 1 Tablet by mouth Every morning. 8/20/21  Yes Varun Hudson PA-C   fluticasone propionate (FLONASE) 50 mcg/actuation nasal spray SPRAY 2 SPRAYS INTO EACH NOSTRIL EVERY DAY 6/4/21  Yes Elysia Pino MD   Calcium-Cholecalciferol, D3, (Calcium 600 with Vitamin D3) 600 mg(1,500mg) -400 unit chew Take 1 Tab by mouth daily. 3/8/21  Yes Varun Hudson PA-C   Asmanex  mcg/actuation HFAA inhaler INHALE 1 PUFF BY MOUTH TWICE DAILY 2/23/21  Yes Provider, Historical   teriflunomide (AUBAGIO) 14 mg tablet Take 1 Tab by mouth daily. Yes Provider, Historical   montelukast (SINGULAIR) 10 mg tablet TAKE 1 TABLET BY MOUTH ONCE DAILY  Patient not taking: Reported on 8/20/2021 3/29/21   Varnu Hudson PA-C     Patient Active Problem List   Diagnosis Code    Anxiety F41.9    Atherosclerosis of coronary artery I25.10    Depression F32. A    Family history of breast cancer Z80.3    Family history of cervical cancer Z80.49    GERD (gastroesophageal reflux disease) K21.9    IBS (irritable bowel syndrome) K58.9    Migraine headache G43.909    Hyperlipidemia E78.5    Multiple sclerosis, relapsing-remitting (HCC) G35    Overweight E66.3    Recurrent major depressive disorder, in partial remission (HCC) F33.41    Mild intermittent asthma without complication Z63.86     Patient Active Problem List    Diagnosis Date Noted    Migraine headache 03/04/2021    Hyperlipidemia 03/04/2021    Mild intermittent asthma without complication 57/14/0434    GERD (gastroesophageal reflux disease) 10/05/2020    Overweight 09/23/2020    Family history of breast cancer 02/19/2020    Family history of cervical cancer 12/18/2018    Recurrent major depressive disorder, in partial remission ([facility] 12/02/2018    Anxiety 10/23/2017    Atherosclerosis of coronary artery 10/23/2017    Depression 10/23/2017    IBS (irritable bowel syndrome) 10/23/2017    Multiple sclerosis, relapsing-remitting ([facility] 10/23/2017     Current Outpatient Medications   Medication Sig Dispense Refill    butalbital-acetaminophen-caffeine (FIORICET, ESGIC) -40 mg per tablet Take 1 Tablet by mouth every six (6) hours as needed for Headache. 60 Tablet 2    citalopram (CELEXA) 20 mg tablet Take 1 Tablet by mouth daily. 90 Tablet 1    levocetirizine (Xyzal) 5 mg tablet Take  by mouth.  multivitamin with iron tablet Take 1 Tablet by mouth daily.  promethazine (PHENERGAN) 25 mg tablet Take 1 Tablet by mouth every six (6) hours as needed for Nausea. 30 Tablet 2    atorvastatin (LIPITOR) 20 mg tablet Take 1 Tablet by mouth daily. 90 Tablet 3    buPROPion XL (WELLBUTRIN XL) 300 mg XL tablet Take 1 Tablet by mouth Every morning. 90 Tablet 3    fluticasone propionate (FLONASE) 50 mcg/actuation nasal spray SPRAY 2 SPRAYS INTO EACH NOSTRIL EVERY DAY 1 Bottle 1    Calcium-Cholecalciferol, D3, (Calcium 600 with Vitamin D3) 600 mg(1,500mg) -400 unit chew Take 1 Tab by mouth daily. 90 Tab 3    Asmanex  mcg/actuation HFAA inhaler INHALE 1 PUFF BY MOUTH TWICE DAILY      teriflunomide (AUBAGIO) 14 mg tablet Take 1 Tab by mouth daily.        Allergies   Allergen Reactions    Opioids-Methadone And Related Other (comments)     Highly sensitive     Past Medical History:   Diagnosis Date    Asthma in adult, mild intermittent, uncomplicated     Depression     GERD (gastroesophageal reflux disease)     Hyperlipemia     IBS (irritable bowel syndrome)     Major depressive disorder, recurrent episode, in partial remission (HCC)     Migraine headache     Multiple sclerosis, relapsing-remitting ([facility]     Overweight      Past Surgical History:   Procedure Laterality Date    HX CORONARY STENT PLACEMENT  2003    HX HEMORRHOIDECTOMY  2005    HX TUBAL LIGATION  1987     Family History   Problem Relation Age of Onset    Cancer Mother         Cervical, Lung and Breast    Breast Cancer Mother     Heart Disease Father      Social History     Tobacco Use    Smoking status: Former Smoker     Types: Cigarettes    Smokeless tobacco: Never Used   Substance Use Topics    Alcohol use: Not Currently       Review of Systems   Constitutional: Negative for chills, fever, malaise/fatigue and weight loss. HENT: Negative for ear pain, hearing loss and sore throat. Respiratory: Negative for cough and shortness of breath. Cardiovascular: Negative for chest pain and leg swelling. Gastrointestinal: Positive for nausea. Negative for abdominal pain, blood in stool, constipation, diarrhea, heartburn, melena and vomiting. Genitourinary: Negative for dysuria and hematuria. Musculoskeletal: Negative for back pain and myalgias. Skin: Negative for rash. Neurological: Positive for dizziness and headaches. Negative for weakness. Psychiatric/Behavioral: Negative for depression. Objective:     Patient-Reported Vitals 10/21/2021   Patient-Reported Weight 162.5      General: alert, cooperative, no distress   Mental  status: normal mood, behavior, speech, dress, motor activity, and thought processes, able to follow commands   HENT: NCAT   Neck: no visualized mass   Resp: no respiratory distress   Neuro: no gross deficits   Skin: no discoloration or lesions of concern on visible areas   Psychiatric: normal affect, consistent with stated mood, no evidence of hallucinations     Additional exam findings: We discussed the expected course, resolution and complications of the diagnosis(es) in detail. Medication risks, benefits, costs, interactions, and alternatives were discussed as indicated. I advised her to contact the office if her condition worsens, changes or fails to improve as anticipated. She expressed understanding with the diagnosis(es) and plan. Yovany Garduno, was evaluated through a synchronous (real-time) audio-video encounter. The patient (or guardian if applicable) is aware that this is a billable service. Verbal consent to proceed has been obtained within the past 12 months. The visit was conducted pursuant to the emergency declaration under the [address] [place] [address], [address] [place] [address] authority and the [employer] Act. Patient identification was verified, and a caregiver was present when appropriate. The patient was located in a state where the provider was credentialed to provide care.     Monica Spangler PA-C

## 2021-10-21 NOTE — PROGRESS NOTES
Faustina Human  Identified pt with two pt identifiers(name and ). Chief Complaint   Patient presents with    Headache       Reviewed record In preparation for visit and have obtained necessary documentation. 1. Have you been to the ER, urgent care clinic or hospitalized since your last visit? No     2. Have you seen or consulted any other health care providers outside of the [address] [place] [address] since your last visit? Include any pap smears or colon screening. No    Vitals reviewed with provider. Health Maintenance reviewed: pap and colon in [address] Health Maintenance Due   Topic    Cervical cancer screen     Colorectal Cancer Screening Combo     Flu Vaccine (1)        Wt Readings from Last 3 Encounters:   21 171 lb (77.6 kg)      Temp Readings from Last 3 Encounters:   21 98.4 °F (36.9 °C) (Oral)      BP Readings from Last 3 Encounters:   21 110/75      Pulse Readings from Last 3 Encounters:   21 77      There were no vitals filed for this visit. Learning Assessment:   :     Learning Assessment 3/4/2021   PRIMARY LEARNER Patient   HIGHEST LEVEL OF EDUCATION - PRIMARY LEARNER  2 YEARS OF COLLEGE   BARRIERS PRIMARY LEARNER NONE   PRIMARY LANGUAGE ENGLISH   LEARNER PREFERENCE PRIMARY READING   ANSWERED BY Patient   RELATIONSHIP SELF        Depression Screening:   :     3 most recent PHQ Screens 2021   PHQ Not Done Active Diagnosis of Depression or Bipolar Disorder   Little interest or pleasure in doing things -   Feeling down, depressed, irritable, or hopeless -   Total Score PHQ 2 -        Fall Risk Assessment:   :     Fall Risk Assessment, last 12 mths 3/4/2021   Able to walk? Yes   Fall in past 12 months? 0        Abuse Screening:   :     Abuse Screening Questionnaire 3/4/2021   Do you ever feel afraid of your partner? N   Are you in a relationship with someone who physically or mentally threatens you? N   Is it safe for you to go home?  Mini Oliveira ADL Screening:   :     ADL Assessment 8/20/2021   Feeding yourself No Help Needed   Getting from bed to chair No Help Needed   Getting dressed No Help Needed   Bathing or showering No Help Needed   Walk across the room (includes cane/walker) No Help Needed   Using the telphone No Help Needed   Taking your medications No Help Needed   Preparing meals No Help Needed   Managing money (expenses/bills) No Help Needed   Moderately strenuous housework (laundry) No Help Needed   Shopping for personal items (toiletries/medicines) No Help Needed   Shopping for groceries No Help Needed   Driving No Help Needed   Climbing a flight of stairs No Help Needed   Getting to places beyond walking distances No Help Needed

## 2021-10-22 ENCOUNTER — DOCUMENTATION ONLY (OUTPATIENT)
Dept: INTERNAL MEDICINE CLINIC | Age: 66
End: 2021-10-22

## 2021-10-22 NOTE — PROGRESS NOTES
Colonoscopy report received from Dr Ximena Coronel repeat 5 years. Pap smear report received from Dr Murtaza Ruiz.

## 2022-02-21 NOTE — PROGRESS NOTES
Danielle Gramajo is a 73y.o. year old female seen in clinic today for   Chief Complaint   Patient presents with    Multiple Sclerosis     6 mo f/u    Peripheral Neuropathy       she is here today to follow up for MS, HLD, Depression    Hx of Depression  Currently treated with Celexa 20 mg, Wellbutrin 300 mg, uses light therapy every day  Side effects from medications: none  Current symptoms of depression: has some fatigue at times, unsure if post covid, MS related or depression  Improved from prior state: yes. Notes it has been \"a rough winter\" but not the worst winter she has been through depression wise. Has plans to see family next month. MS: Had negative MRI last year. Has had tension headaches and now pulling/grabbing in L lower back causing pain. Believes it to be related to MS with muscle spasms. Patient has has hx of HLD. Takes 10 mg lipitor. No RUQ pain, no jaundice, no muscle aches. Asthma is well controlled. Using Asthmanex twice a day. Still feels short of breath at times when she walks and congested at times, but notes that is chronic. No bronchitis this year or PNA. Did get COVID in [address] [facility] [address]. she specifically denies any CP, SOB, HA. Dizziness, fevers, chills, N/V/D, urinary symptoms or other bowel changes. Current Outpatient Medications on File Prior to Visit   Medication Sig Dispense Refill    butalbital-acetaminophen-caffeine (FIORICET, ESGIC) -40 mg per tablet Take 1 Tablet by mouth every six (6) hours as needed for Headache. 60 Tablet 2    citalopram (CELEXA) 20 mg tablet Take 1 Tablet by mouth daily. 90 Tablet 1    levocetirizine (Xyzal) 5 mg tablet Take  by mouth.  multivitamin with iron tablet Take 1 Tablet by mouth daily.  promethazine (PHENERGAN) 25 mg tablet Take 1 Tablet by mouth every six (6) hours as needed for Nausea. 30 Tablet 2    atorvastatin (LIPITOR) 20 mg tablet Take 1 Tablet by mouth daily.  90 Tablet 3    buPROPion XL (WELLBUTRIN XL) 300 mg XL tablet Take 1 Tablet by mouth Every morning. 90 Tablet 3    fluticasone propionate (FLONASE) 50 mcg/actuation nasal spray SPRAY 2 SPRAYS INTO EACH NOSTRIL EVERY DAY 1 Bottle 1    Calcium-Cholecalciferol, D3, (Calcium 600 with Vitamin D3) 600 mg(1,500mg) -400 unit chew Take 1 Tab by mouth daily. 90 Tab 3    Asmanex  mcg/actuation HFAA inhaler INHALE 1 PUFF BY MOUTH TWICE DAILY      teriflunomide (AUBAGIO) 14 mg tablet Take 1 Tab by mouth daily. No current facility-administered medications on file prior to visit.          Allergies   Allergen Reactions    Opioids-Methadone And Related Other (comments)     Highly sensitive     Past Medical History:   Diagnosis Date    Asthma in adult, mild intermittent, uncomplicated     Depression     GERD (gastroesophageal reflux disease)     Hyperlipemia     IBS (irritable bowel syndrome)     Major depressive disorder, recurrent episode, in partial remission (HCC)     Migraine headache     Multiple sclerosis, relapsing-remitting ([facility] [address]     Overweight       Past Surgical History:   Procedure Laterality Date    HX CORONARY STENT PLACEMENT  2003    HX HEMORRHOIDECTOMY  2005    HX TUBAL LIGATION  1987        Family History   Problem Relation Age of Onset    Cancer Mother         Cervical, Lung and Breast    Breast Cancer Mother     Heart Disease Father         Social History     Socioeconomic History    Marital status:      Spouse name: Not on file    Number of children: Not on file    Years of education: Not on file    Highest education level: Not on file   Occupational History    Not on file   Tobacco Use    Smoking status: Former Smoker     Types: Cigarettes    Smokeless tobacco: Never Used   Vaping Use    Vaping Use: Former   Substance and Sexual Activity    Alcohol use: Not Currently    Drug use: Never    Sexual activity: Not on file   Other Topics Concern    Not on file   Social History Narrative    Not on file     Social Determinants of Health     Financial Resource Strain:     Difficulty of Paying Living Expenses: Not on file   Food Insecurity:     Worried About Running Out of Food in the Last Year: Not on file    Markell of Food in the Last Year: Not on file   Transportation Needs:     Lack of Transportation (Medical): Not on file    Lack of Transportation (Non-Medical): Not on file   Physical Activity:     Days of Exercise per Week: Not on file    Minutes of Exercise per Session: Not on file   Stress:     Feeling of Stress : Not on file   Social Connections:     Frequency of Communication with Friends and Family: Not on file    Frequency of Social Gatherings with Friends and Family: Not on file    Attends Jainism Services: Not on file    Active Member of [address] [place] [address] or Organizations: Not on file    Attends Club or Organization Meetings: Not on file    Marital Status: Not on file   Intimate Partner Violence:     Fear of Current or Ex-Partner: Not on file    Emotionally Abused: Not on file    Physically Abused: Not on file    Sexually Abused: Not on file   Housing Stability:     Unable to Pay for Housing in the Last Year: Not on file    Number of [place] in the Last Year: Not on file    Unstable Housing in the Last Year: Not on file           Visit Vitals  /80 (BP 1 Location: Left upper arm, BP Patient Position: Sitting, BP Cuff Size: Adult)   Pulse 77   Temp 97.5 °F (36.4 °C) (Oral)   Resp 18   Ht 5' 4\" (1.626 m)   Wt 152 lb 3.2 oz (69 kg)   SpO2 97%   BMI 26.13 kg/m²       Review of Systems   Constitutional: Negative for chills, fever, malaise/fatigue and weight loss. HENT: Negative for ear pain, hearing loss and sore throat. Respiratory: Negative for cough and shortness of breath. Cardiovascular: Negative for chest pain and leg swelling. Gastrointestinal: Negative for abdominal pain, blood in stool, constipation, diarrhea, heartburn, melena, nausea and vomiting.    Genitourinary: Negative for dysuria and hematuria. Musculoskeletal: Positive for back pain and myalgias. Skin: Negative for rash. Neurological: Negative for weakness and headaches. Psychiatric/Behavioral: Negative for depression. Physical Exam  Vitals and nursing note reviewed. Constitutional:       Appearance: Normal appearance. She is normal weight. Comments: Walking with arm brace/crutch   HENT:      Head: Normocephalic and atraumatic. Right Ear: External ear normal.      Left Ear: External ear normal.      Nose: Nose normal.      Mouth/Throat:      Mouth: Mucous membranes are moist.      Pharynx: Oropharynx is clear. Eyes:      Conjunctiva/sclera: Conjunctivae normal.      Pupils: Pupils are equal, round, and reactive to light. Neck:      Vascular: No carotid bruit. Cardiovascular:      Rate and Rhythm: Normal rate and regular rhythm. Pulses: Normal pulses. Heart sounds: Normal heart sounds. Pulmonary:      Effort: Pulmonary effort is normal.      Breath sounds: Normal breath sounds. No wheezing, rhonchi or rales. Abdominal:      General: Abdomen is flat. Bowel sounds are normal. There is no distension. Palpations: There is no mass. Tenderness: There is no abdominal tenderness. There is no guarding or rebound. Musculoskeletal:         General: Normal range of motion. Cervical back: Normal range of motion and neck supple. No rigidity. Right lower leg: No edema. Left lower leg: No edema. Skin:     General: Skin is warm and dry. Capillary Refill: Capillary refill takes less than 2 seconds. Neurological:      General: No focal deficit present. Mental Status: She is alert and oriented to person, place, and time. Sensory: No sensory deficit. Gait: Gait normal.   Psychiatric:         Mood and Affect: Mood normal.         Behavior: Behavior normal.         Thought Content:  Thought content normal.          No results found for this or any previous visit (from the past 24 hour(s)). ASSESSMENT AND PLAN  Diagnoses and all orders for this visit:    1. Migraine without status migrainosus, not intractable, unspecified migraine type    2. Multiple sclerosis, relapsing-remitting ([facility] [address]    3. Recurrent major depressive disorder, in partial remission ([facility] [address]    4. Atherosclerosis of native coronary artery of native heart with angina pectoris ([facility] [address]  -     LIPID PANEL; Future  -     METABOLIC PANEL, COMPREHENSIVE; Future    5. Mild intermittent asthma without complication    6. Hyperlipidemia, unspecified hyperlipidemia type  -     LIPID PANEL; Future  -     METABOLIC PANEL, COMPREHENSIVE; Future    7. Encounter for screening mammogram for malignant neoplasm of breast  -     [facility] MAMMO BI SCREENING INCL CAD; Future    8. Acute left-sided low back pain without sciatica  -     REFERRAL TO PHYSICAL THERAPY    9. Intractable episodic tension-type headache  -     REFERRAL TO PHYSICAL THERAPY    10. Encounter for immunization  -     PNEUMOCOCCAL POLYSACCHARIDE VACCINE, 23-VALENT, ADULT OR IMMUNOSUPPRESSED PT DOSE,    Due for labs and mammo. DEXA in 2023, diffuse osteopenia. Continue Vit D/Calcium. Continue Neurology follow up for MS. Send for PT for massage/dry needling therapy for cervical tension and L gluteal tension, possibly related to MS. Last Pneumoccocal shot given today. Recheck lipids, LFTs, kidney function. I have discussed the diagnosis with the patient and the intended plan as seen in the above orders. Patient is in agreement. The patient has received an after-visit summary and questions were answered concerning future plans. I have discussed medication side effects and warnings with the patient as well.     Cathy Vergara PA-C

## 2022-02-22 ENCOUNTER — OFFICE VISIT (OUTPATIENT)
Dept: INTERNAL MEDICINE CLINIC | Age: 67
End: 2022-02-22
Payer: MEDICARE

## 2022-02-22 VITALS
RESPIRATION RATE: 18 BRPM | HEART RATE: 77 BPM | BODY MASS INDEX: 25.99 KG/M2 | HEIGHT: 64 IN | SYSTOLIC BLOOD PRESSURE: 115 MMHG | TEMPERATURE: 97.5 F | OXYGEN SATURATION: 97 % | DIASTOLIC BLOOD PRESSURE: 80 MMHG | WEIGHT: 152.2 LBS

## 2022-02-22 DIAGNOSIS — J45.20 MILD INTERMITTENT ASTHMA WITHOUT COMPLICATION: ICD-10-CM

## 2022-02-22 DIAGNOSIS — I25.119 ATHEROSCLEROSIS OF NATIVE CORONARY ARTERY OF NATIVE HEART WITH ANGINA PECTORIS (HCC): ICD-10-CM

## 2022-02-22 DIAGNOSIS — G44.211 INTRACTABLE EPISODIC TENSION-TYPE HEADACHE: ICD-10-CM

## 2022-02-22 DIAGNOSIS — M54.50 ACUTE LEFT-SIDED LOW BACK PAIN WITHOUT SCIATICA: ICD-10-CM

## 2022-02-22 DIAGNOSIS — G35 MULTIPLE SCLEROSIS, RELAPSING-REMITTING (HCC): ICD-10-CM

## 2022-02-22 DIAGNOSIS — F33.41 RECURRENT MAJOR DEPRESSIVE DISORDER, IN PARTIAL REMISSION (HCC): ICD-10-CM

## 2022-02-22 DIAGNOSIS — Z23 ENCOUNTER FOR IMMUNIZATION: ICD-10-CM

## 2022-02-22 DIAGNOSIS — Z12.31 ENCOUNTER FOR SCREENING MAMMOGRAM FOR MALIGNANT NEOPLASM OF BREAST: ICD-10-CM

## 2022-02-22 DIAGNOSIS — G43.909 MIGRAINE WITHOUT STATUS MIGRAINOSUS, NOT INTRACTABLE, UNSPECIFIED MIGRAINE TYPE: Primary | ICD-10-CM

## 2022-02-22 DIAGNOSIS — E78.5 HYPERLIPIDEMIA, UNSPECIFIED HYPERLIPIDEMIA TYPE: ICD-10-CM

## 2022-02-22 PROCEDURE — G8399 PT W/DXA RESULTS DOCUMENT: HCPCS | Performed by: PHYSICIAN ASSISTANT

## 2022-02-22 PROCEDURE — 99214 OFFICE O/P EST MOD 30 MIN: CPT | Performed by: PHYSICIAN ASSISTANT

## 2022-02-22 PROCEDURE — G9899 SCRN MAM PERF RSLTS DOC: HCPCS | Performed by: PHYSICIAN ASSISTANT

## 2022-02-22 PROCEDURE — G8536 NO DOC ELDER MAL SCRN: HCPCS | Performed by: PHYSICIAN ASSISTANT

## 2022-02-22 PROCEDURE — 3017F COLORECTAL CA SCREEN DOC REV: CPT | Performed by: PHYSICIAN ASSISTANT

## 2022-02-22 PROCEDURE — G8427 DOCREV CUR MEDS BY ELIG CLIN: HCPCS | Performed by: PHYSICIAN ASSISTANT

## 2022-02-22 PROCEDURE — G9717 DOC PT DX DEP/BP F/U NT REQ: HCPCS | Performed by: PHYSICIAN ASSISTANT

## 2022-02-22 PROCEDURE — 1101F PT FALLS ASSESS-DOCD LE1/YR: CPT | Performed by: PHYSICIAN ASSISTANT

## 2022-02-22 PROCEDURE — 90732 PPSV23 VACC 2 YRS+ SUBQ/IM: CPT | Performed by: PHYSICIAN ASSISTANT

## 2022-02-22 PROCEDURE — G0009 ADMIN PNEUMOCOCCAL VACCINE: HCPCS | Performed by: PHYSICIAN ASSISTANT

## 2022-02-22 PROCEDURE — 1090F PRES/ABSN URINE INCON ASSESS: CPT | Performed by: PHYSICIAN ASSISTANT

## 2022-02-22 PROCEDURE — G8419 CALC BMI OUT NRM PARAM NOF/U: HCPCS | Performed by: PHYSICIAN ASSISTANT

## 2022-02-22 NOTE — PROGRESS NOTES
Rm    Chief Complaint   Patient presents with    Multiple Sclerosis     6 mo f/u    Peripheral Neuropathy        Visit Vitals  /80 (BP 1 Location: Left upper arm, BP Patient Position: Sitting, BP Cuff Size: Adult)   Pulse 77   Temp 97.5 °F (36.4 °C) (Oral)   Resp 18   Ht 5' 4\" (1.626 m)   Wt 152 lb 3.2 oz (69 kg)   SpO2 97%   BMI 26.13 kg/m²        1. Have you been to the ER, urgent care clinic since your last visit? Hospitalized since your last visit? No    2. Have you seen or consulted any other health care providers outside of the [address] [place] [address] since your last visit? Include any pap smears or colon screening. No     Health Maintenance Due   Topic Date Due    Pneumococcal 65+ years (2 of 2 - PPSV23) 10/31/2021    Breast Cancer Screen Mammogram  03/08/2022        3 most recent PHQ Screens 2/22/2022   PHQ Not Done -   Little interest or pleasure in doing things Not at all   Feeling down, depressed, irritable, or hopeless Not at all   Total Score PHQ 2 0   Trouble falling or staying asleep, or sleeping too much -   Feeling tired or having little energy -   Poor appetite, weight loss, or overeating -   Feeling bad about yourself - or that you are a failure or have let yourself or your family down -   Trouble concentrating on things such as school, work, reading, or watching TV -   Moving or speaking so slowly that other people could have noticed; or the opposite being so fidgety that others notice -   Thoughts of being better off dead, or hurting yourself in some way -   PHQ 9 Score -        Fall Risk Assessment, last 12 mths 2/22/2022   Able to walk? Yes   Fall in past 12 months? 0   Do you feel unsteady?  0   Are you worried about falling 0       Learning Assessment 3/4/2021   PRIMARY LEARNER Patient   HIGHEST LEVEL OF EDUCATION - PRIMARY LEARNER  2 YEARS OF COLLEGE   BARRIERS PRIMARY LEARNER NONE   PRIMARY LANGUAGE ENGLISH   LEARNER PREFERENCE PRIMARY READING   ANSWERED BY Patient RELATIONSHIP SELF

## 2022-02-27 ENCOUNTER — PATIENT MESSAGE (OUTPATIENT)
Dept: INTERNAL MEDICINE CLINIC | Age: 67
End: 2022-02-27

## 2022-02-28 RX ORDER — CITALOPRAM 20 MG/1
20 TABLET, FILM COATED ORAL DAILY
Qty: 90 TABLET | Refills: 1 | Status: SHIPPED | OUTPATIENT
Start: 2022-02-28 | End: 2022-07-06

## 2022-02-28 NOTE — TELEPHONE ENCOUNTER
PCP: Grant Garcia PA-C     Last appt: 2/22/2022   Future Appointments   Date Time Provider Clinton Meeks   3/9/2022 12:00 PM Eric Romero PT MRM O[facility]   3/31/2022 10:30 AM GEE [facility] [address] [place] [address]   8/22/2022  8:00 AM Grant Garcia PA-C [facility] BS AMB        Requested Prescriptions     Pending Prescriptions Disp Refills    citalopram (CELEXA) 20 mg tablet 90 Tablet 1     Sig: Take 1 Tablet by mouth daily.

## 2022-02-28 NOTE — TELEPHONE ENCOUNTER
From: Harshad Mendoza  To: [facility]  Sent: 2/27/2022 2:32 PM EST  Subject: Rx needed     I need a new prescription for Citalopram 20mg sent to [facility] and I discussed this medication at my recent appointment and agreed that I should continue taking it.   Thank you   Gissell Sr   1955

## 2022-03-09 ENCOUNTER — HOSPITAL ENCOUNTER (OUTPATIENT)
Dept: PHYSICAL THERAPY | Age: 67
Discharge: HOME | End: 2022-03-09
Payer: MEDICARE

## 2022-03-09 PROCEDURE — 97162 PT EVAL MOD COMPLEX 30 MIN: CPT | Performed by: PHYSICAL THERAPIST

## 2022-03-09 PROCEDURE — 97110 THERAPEUTIC EXERCISES: CPT | Performed by: PHYSICAL THERAPIST

## 2022-03-09 NOTE — PROGRESS NOTES
PT INITIAL EVALUATION NOTE - [facility] 2-15    Patient Name: Rani Gold  Date:3/9/2022  : 1955  [x]  Patient  Verified  Payor: Kin Go / Plan: [place] MED[place] PART [place] & B / Product Type: Medicare /    In time: 3642  Out time: 107  Total Treatment Time (min): 55  Total Timed Codes (min): 25  1:1 Treatment Time ( only): 25   Visit #: 1     Treatment Area: Low back pain, unspecified [M54.50]    SUBJECTIVE  Pain Level (0-10 scale): 5  Any medication changes, allergies to medications, adverse drug reactions, diagnosis change, or new procedure performed?: [] No    [x] Yes (see summary sheet for update)  Subjective:    Pt reports a 32 year history of MS. She reports that she deals with pain every day in her neck shoulders and back. She complains of grabbing pain in the left buttock that spasms and can throw her in the floor. She has not had any falls but has had near misses numerous times but she reports that she relies heavily on the [place] crutch that she is using today to maintain her balance. She reports that her most recent MS flare up started last August which started with headaches for 3 months and has now shifted to her low back. This is what has led her to PT. She enjoys walking and completes about 1/4 mile in ~13 minutes. She would like to get back to her PLOF of doing that ~3 days a week. She lives in an apartment and has stairs to get to her home. Going up she does 1 step at a time and on bad days will come down on her bottom. She is not currently completing household activities and has a friend doing her laundry. She does have days that she stays in bed. She complains that her brain is \"type A\" and her body is not and can't keep up. She does complain of constant numbness and tingling in the hands and feet. She reports that transferring sit to stand is really challenging. She has grab bars for the shower         OBJECTIVE/EXAMINATION    OBJECTIVE    Posture:   Forward/rounded shoulders and head  Other Observations:  Legs crossed Right over left   Gait and Functional Mobility:  antalgic  Palpation: non tender        Lumbar AROM:          R  L    Flexion     50     Extension    25     Side Bending   NT  NT    Rotation   75  50        LOWER QUARTER   MUSCLE STRENGTH  KEY       R  L  0 - No Contraction  L1, L2 Psoas  5  3  1 - Trace   L3 Quads  5  4  2 - Poor   L4 Tib Ant  5  3  3 - Fair    L5 EHL  NT  NT  4 - Good   S1 Peroneals  NT  NT  5 - Normal   S2 Hams  4  3    Flexibility: decreased hamstring and piriformis  Mobility Assessment: NA      MMT:               HIP ER: 3              HIP Abd: 3  Neurological: Reflexes / Sensations: grossly intact  Special Tests:    Trendelenberg: +    FABERS: -   Forward Bend: +    Slump: -   H.S. SLR: +     Piriformis Ext: +   Long Sit: NT     Lumbar Distraction: NT   SI Compression/Distraction: +    25 min Therapeutic Exercise:  [x] See flow sheet :   Rationale: increase ROM, increase strength and improve coordination to improve the patients ability to complete all activity    With   [] TE   [] TA   [] Neuro   [] SC   [] other: Patient Education: [x] Review HEP    [] Progressed/Changed HEP based on:   [] positioning   [] body mechanics   [] transfers   [] heat/ice application    [] other:      Other Objective/Functional Measures: FOTO Functional Measure: 12/100                         Pain Level (0-10 scale) post treatment: 3-4    ASSESSMENT/Changes in Function:     [x]  See Plan of Kareem, PT 3/9/2022

## 2022-03-15 ENCOUNTER — HOSPITAL ENCOUNTER (OUTPATIENT)
Dept: PHYSICAL THERAPY | Age: 67
Discharge: HOME | End: 2022-03-15
Payer: MEDICARE

## 2022-03-15 PROCEDURE — 97110 THERAPEUTIC EXERCISES: CPT

## 2022-03-15 NOTE — PROGRESS NOTES
PT DAILY TREATMENT NOTE - [facility] 2-15    Patient Name: Danielle Gramajo  Date:3/15/2022  : 1955  [x]  Patient  Verified  Payor: Magui Melgoza / Plan: [place] MEDICARE PART A & B / Product Type: Medicare /    In time: 853  Out time: 6981  Total Treatment Time (min): 61  Total Timed Codes (min): 56  1:1 Treatment Time ( only): 64   Visit #:  2    Treatment Area: Low back pain, unspecified [M54.50]    SUBJECTIVE  Pain Level (0-10 scale): 0  Any medication changes, allergies to medications, adverse drug reactions, diagnosis change, or new procedure performed?: [x] No    [] Yes (see summary sheet for update)  Subjective functional status/changes:   [] No changes reported  Patient reports she has been feeling better with her HEP. OBJECTIVE    61 min Therapeutic Exercise:  [] See flow sheet :   Rationale: increase ROM and increase strength to improve the patients ability to perform ADLs and reduce pain levels    With   [] TE   [] TA   [] Neuro   [] SC   [] other: Patient Education: [x] Review HEP    [] Progressed/Changed HEP based on:   [] positioning   [] body mechanics   [] transfers   [] heat/ice application    [] other:      Other Objective/Functional Measures: none noted     Pain Level (0-10 scale) post treatment: 0/10    ASSESSMENT/Changes in Function:   Fatigues quickly with glute strengthening. Fair tandem stance. Tolerated all interventions well, will continue to progress as tolerated. Patient will continue to benefit from skilled PT services to modify and progress therapeutic interventions, address functional mobility deficits, address ROM deficits, address strength deficits, analyze and address soft tissue restrictions, analyze and cue movement patterns, analyze and modify body mechanics/ergonomics and assess and modify postural abnormalities to attain remaining goals.      []  See Plan of Care  []  See progress note/recertification  []  See Discharge Summary         Progress towards goals / Updated goals:  Patient is progressing towards goals.      PLAN  []  Upgrade activities as tolerated     []  Continue plan of care  []  Update interventions per flow sheet       []  Discharge due to:_  []  Other:_      Gabriel Anaya, CHANDRIKA 3/15/2022

## 2022-03-18 ENCOUNTER — HOSPITAL ENCOUNTER (OUTPATIENT)
Dept: PHYSICAL THERAPY | Age: 67
Discharge: HOME | End: 2022-03-18
Payer: MEDICARE

## 2022-03-18 PROBLEM — J45.20 MILD INTERMITTENT ASTHMA WITHOUT COMPLICATION: Status: ACTIVE | Noted: 2021-03-04

## 2022-03-18 PROBLEM — K58.9 IBS (IRRITABLE BOWEL SYNDROME): Status: ACTIVE | Noted: 2017-10-23

## 2022-03-18 PROBLEM — I25.10 ATHEROSCLEROSIS OF CORONARY ARTERY: Status: ACTIVE | Noted: 2017-10-23

## 2022-03-18 PROBLEM — K21.9 GERD (GASTROESOPHAGEAL REFLUX DISEASE): Status: ACTIVE | Noted: 2020-10-05

## 2022-03-18 PROBLEM — Z80.49 FAMILY HISTORY OF CERVICAL CANCER: Status: ACTIVE | Noted: 2018-12-18

## 2022-03-18 PROCEDURE — 97110 THERAPEUTIC EXERCISES: CPT

## 2022-03-18 NOTE — PROGRESS NOTES
PT DAILY TREATMENT NOTE - [facility] 2-15    Patient Name: Henry Carter  Date:3/18/2022  : 1955  [x]  Patient  Verified  Payor: Camille Sena / Plan: [place] MEDICARE PART A & B / Product Type: Medicare /    In time: 672  Out time: 1017  Total Treatment Time (min): 44  Total Timed Codes (min): 24  1:1 Treatment Time ( only): 24  Visit #:  3    Treatment Area: Low back pain, unspecified [M54.50]    SUBJECTIVE  Pain Level (0-10 scale): 0  Any medication changes, allergies to medications, adverse drug reactions, diagnosis change, or new procedure performed?: [x] No    [] Yes (see summary sheet for update)  Subjective functional status/changes:   [] No changes reported  Patient reports she is a bit stiff, but no major pain coming in today. OBJECTIVE    44 min Therapeutic Exercise:  [x] See flow sheet :   Rationale: increase ROM and increase strength to improve the patients ability to perform ADLs and reduce pain levels    With   [] TE   [] TA   [] Neuro   [] SC   [] other: Patient Education: [x] Review HEP    [] Progressed/Changed HEP based on:   [] positioning   [] body mechanics   [] transfers   [] heat/ice application    [] other:      Other Objective/Functional Measures: none noted     Pain Level (0-10 scale) post treatment: 0/10    ASSESSMENT/Changes in Function:   Fatigued quickly with glute strengthening. Tolerated all therex well, will continue to progress as tolerated. Patient will continue to benefit from skilled PT services to modify and progress therapeutic interventions, address functional mobility deficits, address ROM deficits, address strength deficits, analyze and address soft tissue restrictions, analyze and cue movement patterns, analyze and modify body mechanics/ergonomics and assess and modify postural abnormalities to attain remaining goals.      []  See Plan of Care  []  See progress note/recertification  []  See Discharge Summary         Progress towards goals / Updated goals:  Patient is progressing towards goals.      PLAN  []  Upgrade activities as tolerated     []  Continue plan of care  []  Update interventions per flow sheet       []  Discharge due to:_  []  Other:_      Patricia Kimble, CHANDRIKA 3/18/2022

## 2022-03-19 PROBLEM — F33.41 RECURRENT MAJOR DEPRESSIVE DISORDER, IN PARTIAL REMISSION (HCC): Status: ACTIVE | Noted: 2018-12-02

## 2022-03-19 PROBLEM — Z80.3 FAMILY HISTORY OF BREAST CANCER: Status: ACTIVE | Noted: 2020-02-19

## 2022-03-19 PROBLEM — E66.3 OVERWEIGHT: Status: ACTIVE | Noted: 2020-09-23

## 2022-03-19 PROBLEM — F41.9 ANXIETY: Status: ACTIVE | Noted: 2017-10-23

## 2022-03-19 PROBLEM — G35 MULTIPLE SCLEROSIS, RELAPSING-REMITTING (HCC): Status: ACTIVE | Noted: 2017-10-23

## 2022-03-19 PROBLEM — E78.5 HYPERLIPIDEMIA: Status: ACTIVE | Noted: 2021-03-04

## 2022-03-19 PROBLEM — F32.A DEPRESSION: Status: ACTIVE | Noted: 2017-10-23

## 2022-03-20 PROBLEM — G43.909 MIGRAINE HEADACHE: Status: ACTIVE | Noted: 2021-03-04

## 2022-03-30 ENCOUNTER — APPOINTMENT (OUTPATIENT)
Dept: PHYSICAL THERAPY | Age: 67
End: 2022-03-30
Payer: MEDICARE

## 2022-03-31 ENCOUNTER — HOSPITAL ENCOUNTER (OUTPATIENT)
Dept: MAMMOGRAPHY | Age: 67
Discharge: HOME | End: 2022-03-31
Attending: PHYSICIAN ASSISTANT

## 2022-03-31 ENCOUNTER — TELEPHONE (OUTPATIENT)
Dept: INTERNAL MEDICINE CLINIC | Age: 67
End: 2022-03-31

## 2022-03-31 ENCOUNTER — TRANSCRIBE ORDER (OUTPATIENT)
Dept: GENERAL RADIOLOGY | Age: 67
End: 2022-03-31

## 2022-03-31 DIAGNOSIS — Z12.31 SCREENING MAMMOGRAM, ENCOUNTER FOR: Primary | ICD-10-CM

## 2022-03-31 DIAGNOSIS — N63.20 MASS OF LEFT BREAST, UNSPECIFIED QUADRANT: Primary | ICD-10-CM

## 2022-03-31 DIAGNOSIS — Z12.31 SCREENING MAMMOGRAM, ENCOUNTER FOR: ICD-10-CM

## 2022-03-31 DIAGNOSIS — Z12.31 ENCOUNTER FOR SCREENING MAMMOGRAM FOR MALIGNANT NEOPLASM OF BREAST: ICD-10-CM

## 2022-03-31 NOTE — TELEPHONE ENCOUNTER
Patient needs a diagnostic mammogram ordered for both breasts and an ultrasound of the left breast. Please advise.      # [phone]

## 2022-03-31 NOTE — TELEPHONE ENCOUNTER
Pt went for screening mammogram today and had to reschedule, she put on the form she has a lump in left breast that has been there for a few days. Per mammogram department needs order for diagnostic bilateral mammogram and left ultrasound.

## 2022-04-01 ENCOUNTER — HOSPITAL ENCOUNTER (OUTPATIENT)
Dept: PHYSICAL THERAPY | Age: 67
Discharge: HOME | End: 2022-04-01
Payer: MEDICARE

## 2022-04-01 PROCEDURE — 97110 THERAPEUTIC EXERCISES: CPT

## 2022-04-01 NOTE — PROGRESS NOTES
PT DAILY TREATMENT NOTE - [facility] 2-15    Patient Name: Fletcher Marion  XYPL:  : 1955  [x]  Patient  Verified  Payor: Mariangel Stringer / Plan: [place] MEDICARE PART A & B / Product Type: Medicare /    In time: 366  Out time: 1025  Total Treatment Time (min): 47  Total Timed Codes (min): 38  1:1 Treatment Time ( only): 38  Visit #:  4    Treatment Area: Low back pain, unspecified [M54.50]    SUBJECTIVE  Pain Level (0-10 scale): 0  Any medication changes, allergies to medications, adverse drug reactions, diagnosis change, or new procedure performed?: [x] No    [] Yes (see summary sheet for update)  Subjective functional status/changes:   [] No changes reported  Patient reports she did not have any issues during her trip [place] [facility] [place] OBJECTIVE    47 min Therapeutic Exercise:  [x] See flow sheet :   Rationale: increase ROM and increase strength to improve the patients ability to perform ADLs and reduce pain levels    With   [] TE   [] TA   [] Neuro   [] SC   [] other: Patient Education: [x] Review HEP    [] Progressed/Changed HEP based on:   [] positioning   [] body mechanics   [] transfers   [] heat/ice application    [] other:      Other Objective/Functional Measures: none noted     Pain Level (0-10 scale) post treatment: 0/10    ASSESSMENT/Changes in Function:   Tolerated all therex well, will continue to progress as tolerated. Patient will continue to benefit from skilled PT services to modify and progress therapeutic interventions, address functional mobility deficits, address ROM deficits, address strength deficits, analyze and address soft tissue restrictions, analyze and cue movement patterns, analyze and modify body mechanics/ergonomics and assess and modify postural abnormalities to attain remaining goals. []  See Plan of Care  []  See progress note/recertification  []  See Discharge Summary         Progress towards goals / Updated goals:  Patient is progressing towards goals.      PLAN  [] Upgrade activities as tolerated     []  Continue plan of care  []  Update interventions per flow sheet       []  Discharge due to:_  []  Other:_      Kita Upton, CHANDRIKA 4/1/2022

## 2022-04-04 ENCOUNTER — TRANSCRIBE ORDER (OUTPATIENT)
Dept: SCHEDULING | Age: 67
End: 2022-04-04

## 2022-04-04 ENCOUNTER — TELEPHONE (OUTPATIENT)
Dept: INTERNAL MEDICINE CLINIC | Age: 67
End: 2022-04-04

## 2022-04-04 DIAGNOSIS — Z12.31 SCREENING MAMMOGRAM FOR HIGH-RISK PATIENT: Primary | ICD-10-CM

## 2022-04-04 DIAGNOSIS — Z03.89 ENCOUNTER FOR OBSERVATION FOR OTHER SUSPECTED DISEASES AND CONDITIONS RULED OUT: ICD-10-CM

## 2022-04-04 DIAGNOSIS — N63.20 BILATERAL BREAST LUMP: ICD-10-CM

## 2022-04-04 DIAGNOSIS — N63.10 BILATERAL BREAST LUMP: ICD-10-CM

## 2022-04-04 DIAGNOSIS — N63.20 MASS OF LEFT BREAST, UNSPECIFIED QUADRANT: Primary | ICD-10-CM

## 2022-04-04 NOTE — TELEPHONE ENCOUNTER
Naima Patiño with [employer] scheduling called and stated that the dx code is being flagged as not covered by insurance. She asked if the order could redone with a corrected code and send back to them.

## 2022-04-05 ENCOUNTER — HOSPITAL ENCOUNTER (OUTPATIENT)
Dept: PHYSICAL THERAPY | Age: 67
Discharge: HOME | End: 2022-04-05
Payer: MEDICARE

## 2022-04-05 PROCEDURE — 97110 THERAPEUTIC EXERCISES: CPT | Performed by: PHYSICAL THERAPIST

## 2022-04-05 NOTE — PROGRESS NOTES
PT DAILY TREATMENT NOTE - [facility] 2-15    Patient Name: Ehsan Quinteros  Date:2022  : 1955  [x]  Patient  Verified  Payor: Janna Medici / Plan: [place] & [place] / Product Type: Medicare /    In time: 1001  Out time: 1046  Total Treatment Time (min): 45  Total Timed Codes (min): 45  1:1 Treatment Time ( only): 45  Visit #:  5    Treatment Area: Low back pain, unspecified [M54.50]    SUBJECTIVE  Pain Level (0-10 scale): 0  Any medication changes, allergies to medications, adverse drug reactions, diagnosis change, or new procedure performed?: [x] No    [] Yes (see summary sheet for update)  Subjective functional status/changes:   [] No changes reported  Pt reports that her trip went well and she was able to walk through airport without issue. OBJECTIVE    45 min Therapeutic Exercise:  [x] See flow sheet :   Rationale: increase ROM and increase strength to improve the patients ability to perform ADLs and reduce pain levels    With   [] TE   [] TA   [] Neuro   [] SC   [] other: Patient Education: [x] Review HEP    [] Progressed/Changed HEP based on:   [] positioning   [] body mechanics   [] transfers   [] heat/ice application    [] other:      Other Objective/Functional Measures: none noted     Pain Level (0-10 scale) post treatment: 0/10    ASSESSMENT/Changes in Function:   Pt is able to complete all activity well. She is getting stronger but states that she is not quite ready to move to a green band. Will continue to strengthen and progress as able. Have asked patient to provide a list of exercise equipment that she has at home. This will hep develop a routine for her to continue independently after discharge. Also encouraged her to get back into her waling routine but asked that she start walking for ~10 minutes to start before trying to jump back into it at 20 minutes.      Patient will continue to benefit from skilled PT services to modify and progress therapeutic interventions, address functional mobility deficits, address ROM deficits, address strength deficits, analyze and address soft tissue restrictions, analyze and cue movement patterns, analyze and modify body mechanics/ergonomics and assess and modify postural abnormalities to attain remaining goals. []  See Plan of Care  []  See progress note/recertification  []  See Discharge Summary         Progress towards goals / Updated goals:  Patient is progressing towards goals.      PLAN  [x]  Upgrade activities as tolerated     [x]  Continue plan of care  [x]  Update interventions per flow sheet       []  Discharge due to:_  []  Other:_      Domonique Junes, PT 4/5/2022

## 2022-04-06 NOTE — PROGRESS NOTES
[facility] Physical Therapy  [facility] ([facility]), [address] [facility]  [address] [place] [address] Matt Guevara  Phone: [phone] Fax: [phone]     Plan of Care/Statement of Necessity for Physical Therapy Services  2-15    Patient name: Rani Gold  : 1955  Provider#: [id number]  Referral source: Jacquelyn Avilez PA-C      Medical/Treatment Diagnosis: Acute left-sided low back pain without sciatica [M54.50]  Intractable episodic tension-type headache [G44.211]     Prior Hospitalization: see medical history     Comorbidities: Allergies, Anxiety or Panic Disorders, Depression, Heart Attack, Neurological Disease  Prior Level of Function: 20 minutes seldom if ever  Medications: Verified on Patient Summary List  Start of Care: 3/9/22      Onset Date: chronic   The Plan of Care and following information is based on the information from the initial evaluation. Assessment/ key information: Pt is a 72 yo female who is in today to begin therapy for left low back pain. She reports that she has a 32 year history of MS which causes pain in the neck, back, and shoulders every day. Her chief complaint today is a grabbing type pain in the left buttock and she reports that spasms in this area have \"thrown her in the floor\"  When asked she denies any falls but states that she has had many \"near misses\". She ambulates with a single Lofstrand crutch to maintain her balance. Her most recent MS flare up started last August with headaches that lasted 3 months before shifting to her back. She would like to get back to her walking routine and improve her strength to make navigating steps easier. She presents with decreased lumbar ROM and decreased strength in the lower extremities. These deficits do limit her ability to balance and are also affecting her gait and endurance.   Pt is a good candidate for PT and will benefit from skilled services to address these deficits and work toward the goals listed below. Evaluation Complexity History HIGH Complexity :3+ comorbidities / personal factors will impact the outcome/ POC ; Examination HIGH Complexity : 4+ Standardized tests and measures addressing body structure, function, activity limitation and / or participation in recreation  ;Presentation MEDIUM Complexity : Evolving with changing characteristics  ; Clinical Decision Making HIGH Complexity : FOTO score of 1- 25   Overall Complexity Rating: MEDIUM    Problem List: pain affecting function, decrease ROM, decrease strength, impaired gait/ balance, decrease ADL/ functional abilitiies, decrease activity tolerance, decrease flexibility/ joint mobility and decrease transfer abilities   Treatment Plan may include any combination of the following: Therapeutic exercise, Therapeutic activities, Neuromuscular re-education, Physical agent/modality, Gait/balance training, Manual therapy, Patient education, Self Care training, Functional mobility training, Home safety training and Stair training  Patient / Family readiness to learn indicated by: asking questions, trying to perform skills and interest  Persons(s) to be included in education: patient (P)  Barriers to Learning/Limitations: None  Patient Goal (s): 100% mobility to seated, rising back better, 100% pain free  Patient Self Reported Health Status: good  Rehabilitation Potential: good    Short Term Goals: To be accomplished in 8-10 treatments:   Pt will be I with HEP  Pt will complain of pain 2-3/10 with all activity  Pt will increase ROM to complete all ADL's more efficiently  Pt will increase core stability to help improve balance and postural control     Long Term Goals:  To be accomplished in 14-16 treatments:   Pt will complain of pain 0-1/10 with all activity  Pt will increase strength to stabilize the core and improve balance with all household chores and ADL's  Pt will increase FOTO score by 9 points to meet Sisi [facility] 112 and improve their function  Pt will return to her walking program without increased symptoms  Pt will implement as exercise routine that she can adhere to which will help her rebuild and maintain strength between her flare ups     Frequency / Duration: Patient to be seen 1-2 times per week for 16 treatments. Patient/ Caregiver education and instruction: self care, activity modification and exercises    [x]  Plan of care has been reviewed with PTA        Certification Period: 3/9/22-67/22  Brennen Vega, PT 3/9/22    ________________________________________________________________________    I certify that the above Therapy Services are being furnished while the patient is under my care. I agree with the treatment plan and certify that this therapy is necessary.     Physician's Signature:____________________  Date:____________Time: _________         Christophe Kessler PA-C

## 2022-04-08 ENCOUNTER — HOSPITAL ENCOUNTER (OUTPATIENT)
Dept: PHYSICAL THERAPY | Age: 67
Discharge: HOME | End: 2022-04-08
Payer: MEDICARE

## 2022-04-08 PROCEDURE — 97110 THERAPEUTIC EXERCISES: CPT

## 2022-04-08 NOTE — PROGRESS NOTES
PT DAILY TREATMENT NOTE - [facility] 2-15    Patient Name: Bassam Paige  Date:2022  : 1955  [x]  Patient  Verified  Payor: Yohana Fuentes / Plan: [place] MEDICARE PART A & B / Product Type: Medicare /    In time: 708  Out time: 1031  Total Treatment Time (min): 58  Total Timed Codes (min): 41  1:1 Treatment Time ( only): 41  Visit #:  6    Treatment Area: Low back pain, unspecified [M54.50]    SUBJECTIVE  Pain Level (0-10 scale): 3/10  Any medication changes, allergies to medications, adverse drug reactions, diagnosis change, or new procedure performed?: [x] No    [] Yes (see summary sheet for update)  Subjective functional status/changes:   [] No changes reported  Patient reports she is not sure what she did, but she has been having more knee pain the past few days. OBJECTIVE    58 min Therapeutic Exercise:  [x] See flow sheet :   Rationale: increase ROM and increase strength to improve the patients ability to perform ADLs and reduce pain levels    With   [] TE   [] TA   [] Neuro   [] SC   [] other: Patient Education: [x] Review HEP    [] Progressed/Changed HEP based on:   [] positioning   [] body mechanics   [] transfers   [] heat/ice application    [] other:      Other Objective/Functional Measures: none noted     Pain Level (0-10 scale) post treatment: 1/10    ASSESSMENT/Changes in Function:   S;ight knee discomfort with sit to stands. Tolerated all therex well, will continue to progress as tolerated. Patient will continue to benefit from skilled PT services to modify and progress therapeutic interventions, address functional mobility deficits, address ROM deficits, address strength deficits, analyze and address soft tissue restrictions, analyze and cue movement patterns, analyze and modify body mechanics/ergonomics and assess and modify postural abnormalities to attain remaining goals.      []  See Plan of Care  []  See progress note/recertification  []  See Discharge Summary         Progress towards goals / Updated goals:  Patient is progressing towards goals.      PLAN  []  Upgrade activities as tolerated     []  Continue plan of care  []  Update interventions per flow sheet       []  Discharge due to:_  []  Other:_      Trace Braga, CHANDRIKA 4/8/2022

## 2022-04-12 ENCOUNTER — HOSPITAL ENCOUNTER (OUTPATIENT)
Dept: PHYSICAL THERAPY | Age: 67
Discharge: HOME | End: 2022-04-12
Payer: MEDICARE

## 2022-04-12 ENCOUNTER — HOSPITAL ENCOUNTER (OUTPATIENT)
Dept: ULTRASOUND IMAGING | Age: 67
Discharge: HOME | End: 2022-04-12
Attending: PHYSICIAN ASSISTANT
Payer: MEDICARE

## 2022-04-12 ENCOUNTER — HOSPITAL ENCOUNTER (OUTPATIENT)
Dept: MAMMOGRAPHY | Age: 67
Discharge: HOME | End: 2022-04-12
Attending: PHYSICIAN ASSISTANT
Payer: MEDICARE

## 2022-04-12 DIAGNOSIS — N63.20 MASS OF LEFT BREAST, UNSPECIFIED QUADRANT: ICD-10-CM

## 2022-04-12 DIAGNOSIS — Z03.89 ENCOUNTER FOR OBSERVATION FOR OTHER SUSPECTED DISEASES AND CONDITIONS RULED OUT: ICD-10-CM

## 2022-04-12 PROCEDURE — 97110 THERAPEUTIC EXERCISES: CPT

## 2022-04-12 PROCEDURE — 76882 US LMTD JT/FCL EVL NVASC XTR: CPT

## 2022-04-12 PROCEDURE — 77066 DX MAMMO INCL CAD BI: CPT

## 2022-04-12 NOTE — PROGRESS NOTES
PT DAILY TREATMENT NOTE - [facility] 2-15    Patient Name: Barrie Humphreys  Date:2022  : 1955  [x]  Patient  Verified  Payor: Tamar Bermeo / Plan: [place] MEDICARE PART A & B / Product Type: Medicare /    In time:930  Out time: 1020  Total Treatment Time (min): 50  Total Timed Codes (min): 45  1:1 Treatment Time ( [address] only): 45  Visit #:  7    Treatment Area: Low back pain, unspecified [M54.50]    SUBJECTIVE  Pain Level (0-10 scale): 0/10  Any medication changes, allergies to medications, adverse drug reactions, diagnosis change, or new procedure performed?: [x] No    [] Yes (see summary sheet for update)  Subjective functional status/changes:   [] No changes reported  Patient reports she has been walking around 10-15 minutes without any issues. She is able to stand for around 30-45 minutes until fatigue kicks in. Patient reports she was tested on nine cysts this morning which both were negative. Patient feels like she is able to do most anything, but is taking her time with progression at home. Patient believes she is 90% better. Has been able to keep up with her ADLs without any help. OBJECTIVE    50 min Therapeutic Exercise:  [x] See flow sheet :   Rationale: increase ROM and increase strength to improve the patients ability to perform ADLs and reduce pain levels    With   [] TE   [] TA   [] Neuro   [] SC   [] other: Patient Education: [x] Review HEP    [] Progressed/Changed HEP based on:   [] positioning   [] body mechanics   [] transfers   [] heat/ice application    [] other:      Other Objective/Functional Measures: FOTO: 50      Pain Level (0-10 scale) post treatment: 0/10    ASSESSMENT/Changes in Function:   Will focus on a home program      []  See Plan of Care  []  See progress note/recertification  [x]  See Discharge Summary         Progress towards goals / Updated goals:  Short Term Goals:  To be accomplished in 8-10 treatments:              Pt will be I with HEP MET  Pt will complain of pain 2-3/10 with all activity   Pt will increase ROM to complete all ADL's more efficiently MET  Pt will increase core stability to help improve balance and postural control MET     Long Term Goals:  To be accomplished in 14-16 treatments:              Pt will complain of pain 0-1/10 with all activity MET  Pt will increase strength to stabilize the core and improve balance with all household chores and ADL's  Pt will increase FOTO score by 9 points to meet [address] [employer] [address] and improve their function MET  Pt will return to her walking program without increased symptoms MET  Pt will implement as exercise routine that she can adhere to which will help her rebuild and maintain strength between her flare ups  MET       PLAN  []  Upgrade activities as tolerated     []  Continue plan of care  []  Update interventions per flow sheet       [x]  Discharge due to:_ goals being met  []  Other:_      Tiesha Roland, PTA 4/12/2022

## 2022-04-26 ENCOUNTER — APPOINTMENT (OUTPATIENT)
Dept: PHYSICAL THERAPY | Age: 67
End: 2022-04-26
Payer: MEDICARE

## 2022-04-29 ENCOUNTER — APPOINTMENT (OUTPATIENT)
Dept: PHYSICAL THERAPY | Age: 67
End: 2022-04-29
Payer: MEDICARE

## 2022-05-23 RX ORDER — FLUTICASONE PROPIONATE 50 MCG
SPRAY, SUSPENSION (ML) NASAL
Qty: 16 G | Refills: 0 | Status: SHIPPED | OUTPATIENT
Start: 2022-05-23 | End: 2022-08-03

## 2022-05-27 NOTE — PROGRESS NOTES
[facility] Physical Therapy  [address] ([address] [place] Sands  Phone: [phone] Fax: [phone]    Discharge Summary 2-15    Patient name: Addie Wilkerson  : 1955  Provider#: [id number]  Referral source: Jeniffer Enamorado PA-C      Medical/Treatment Diagnosis: Low back pain, unspecified [M54.50]     Prior Hospitalization: see medical history     Comorbidities: See Plan of Care  Prior Level of Function: See Plan of Care  Medications: Verified on Patient Summary List    Start of Care: 3/9/22      Onset Date:chronic   Visits from Start of Care: 7     Missed Visits: 1  Reporting Period : 3/9/22 to 22    Assessment/Summary of care: Patient has completed seven sessions of outpatient physical therapy for chronic LBP. She has been able to return to a walking routine without any flare ups limiting her during or after the walk. Patient demonstrated improved strength in both the core and glutes allowing her to perform her ADLs without any major limitations. She will continue to perform her HEP in order to maintain benefits of physical therapy.      Short Term Goals: To be accomplished in 8-10 treatments:              Pt will be I with HEP MET  Pt will complain of pain 2-3/10 with all activity   Pt will increase ROM to complete all ADL's more efficiently MET  Pt will increase core stability to help improve balance and postural control MET     Long Term Goals: To be accomplished in 14-16 treatments:              Pt will complain of pain 0-1/10 with all activity MET  Pt will increase strength to stabilize the core and improve balance with all household chores and ADL's  Pt will increase FOTO score by 9 points to meet MDC and improve their function MET  Pt will return to her walking program without increased symptoms MET  Pt will implement as exercise routine that she can adhere to which will help her rebuild and maintain strength between her flare ups  MET      Other Objective/Functional Measures:  FOTO: 50                 RECOMMENDATIONS:  [x]Discontinue therapy: [x]Patient has reached or is progressing toward set goals     []Patient is non-compliant or has abdicated     []Due to lack of appreciable progress towards set goals     []Other  Clint Anderson, CHANDRIKA 5/27/2022

## 2022-06-07 ENCOUNTER — VIRTUAL VISIT (OUTPATIENT)
Dept: INTERNAL MEDICINE CLINIC | Age: 67
End: 2022-06-07
Payer: MEDICARE

## 2022-06-07 DIAGNOSIS — J45.21 MILD INTERMITTENT ASTHMA WITH EXACERBATION: Primary | ICD-10-CM

## 2022-06-07 PROCEDURE — 1090F PRES/ABSN URINE INCON ASSESS: CPT | Performed by: PHYSICIAN ASSISTANT

## 2022-06-07 PROCEDURE — 99213 OFFICE O/P EST LOW 20 MIN: CPT | Performed by: PHYSICIAN ASSISTANT

## 2022-06-07 PROCEDURE — G8427 DOCREV CUR MEDS BY ELIG CLIN: HCPCS | Performed by: PHYSICIAN ASSISTANT

## 2022-06-07 PROCEDURE — G9899 SCRN MAM PERF RSLTS DOC: HCPCS | Performed by: PHYSICIAN ASSISTANT

## 2022-06-07 PROCEDURE — 1101F PT FALLS ASSESS-DOCD LE1/YR: CPT | Performed by: PHYSICIAN ASSISTANT

## 2022-06-07 PROCEDURE — 1123F ACP DISCUSS/DSCN MKR DOCD: CPT | Performed by: PHYSICIAN ASSISTANT

## 2022-06-07 PROCEDURE — 3017F COLORECTAL CA SCREEN DOC REV: CPT | Performed by: PHYSICIAN ASSISTANT

## 2022-06-07 PROCEDURE — G8399 PT W/DXA RESULTS DOCUMENT: HCPCS | Performed by: PHYSICIAN ASSISTANT

## 2022-06-07 PROCEDURE — G9717 DOC PT DX DEP/BP F/U NT REQ: HCPCS | Performed by: PHYSICIAN ASSISTANT

## 2022-06-07 RX ORDER — ASPIRIN 81 MG/1
81 TABLET ORAL DAILY
COMMUNITY

## 2022-06-07 RX ORDER — LORATADINE 10 MG/1
10 TABLET ORAL
COMMUNITY

## 2022-06-07 RX ORDER — BACLOFEN 10 MG/1
10 TABLET ORAL 2 TIMES DAILY
COMMUNITY
Start: 2022-05-23

## 2022-06-07 RX ORDER — PREDNISONE 10 MG/1
TABLET ORAL
Qty: 13 TABLET | Refills: 0 | Status: SHIPPED | OUTPATIENT
Start: 2022-06-07 | End: 2022-07-06

## 2022-06-07 NOTE — PROGRESS NOTES
Saintclair [place] is a 73 y.o. female who was seen by synchronous (real-time) audio-video technology on 6/7/2022 for Chest Pain (tight)        Assessment & Plan:   Diagnoses and all orders for this visit:    1. Mild intermittent asthma with exacerbation    Other orders  -     predniSONE (DELTASONE) 10 mg tablet; Take 3 tablets on day 1 and 2 (30 mg), then take 2 tablets on day 3,4 (20 mg), then take one tablet on day 5 and 6, then proceed to take 1/2 tablet (5 mg) on day 7 and 8    Patient with symptoms consistent with early asthma flare with intermittent symptoms and signs of progression. She is using Asthmanex to maintain but chest tightness still occurring with coughing. If no improvement will consider in person eval and imaging. Advised to continue Claritin over Xyzal for allergies. The complexity of medical decision making for this visit is moderate     I spent at least 13 minutes on this visit with this established patient. 712  Subjective:   Patient presents via mychart virtual visit for persistent chest tightness, feeling like a contusion below her sternum and causing some tightness with breathing and mild SOB. Notes one day of wheezing and an intermittent cough, occurring mostly at night. She continues to have some PND associated with allergies. She has switched from Xyzal to Claritin. She is using asthmanex once daily due to cost.     Notes her MS flare seems to have resolved. Prior to Admission medications    Medication Sig Start Date End Date Taking? Authorizing Provider   loratadine (Claritin) 10 mg tablet Take 10 mg by mouth. Yes Provider, Historical   baclofen (LIORESAL) 10 mg tablet Take 10 mg by mouth two (2) times a day. 5/23/22  Yes Provider, Historical   aspirin delayed-release 81 mg tablet Take 81 mg by mouth daily.    Yes Provider, Historical   predniSONE (DELTASONE) 10 mg tablet Take 3 tablets on day 1 and 2 (30 mg), then take 2 tablets on day 3,4 (20 mg), then take one tablet on day 5 and 6, then proceed to take 1/2 tablet (5 mg) on day 7 and 8 6/7/22  Yes Kenneth Gonzalez PA-C   fluticasone propionate [facility]) 50 mcg/actuation nasal spray Use 2 spray(s) in each nostril once daily 5/23/22  Yes Kenneth Gonzalez PA-C   citalopram (CELEXA) 20 mg tablet Take 1 Tablet by mouth daily. 2/28/22  Yes Kenneth Gonzalez PA-C   butalbital-acetaminophen-caffeine (FIORICET, ESGIC) -40 mg per tablet Take 1 Tablet by mouth every six (6) hours as needed for Headache. 10/21/21  Yes Kenneth Gonzalez PA-C   multivitamin with iron tablet Take 1 Tablet by mouth daily. Yes Provider, Historical   promethazine (PHENERGAN) 25 mg tablet Take 1 Tablet by mouth every six (6) hours as needed for Nausea. 8/20/21  Yes Kenneth Gonzalez PA-C   atorvastatin (LIPITOR) 20 mg tablet Take 1 Tablet by mouth daily. 8/20/21  Yes Kenneth Gonzalez PA-C   buPROPion XL (WELLBUTRIN XL) 300 mg XL tablet Take 1 Tablet by mouth Every morning. 8/20/21  Yes Kenneth Gonzalez PA-C   Calcium-Cholecalciferol, D3, (Calcium 600 with Vitamin D3) 600 mg(1,500mg) -400 unit chew Take 1 Tab by mouth daily. 3/8/21  Yes Kenneth Gonzalez PA-C   Asmanex  mcg/actuation HFAA inhaler INHALE 1 PUFF BY MOUTH TWICE DAILY 2/23/21  Yes Provider, Historical   teriflunomide (AUBAGIO) 14 mg tablet Take 1 Tab by mouth daily. Yes Provider, Historical   levocetirizine (Xyzal) 5 mg tablet Take  by mouth. 6/7/22  Provider, Historical     Patient Active Problem List   Diagnosis Code    Anxiety F41.9    Atherosclerosis of coronary artery I25.10    Depression F32. A    Family history of breast cancer Z80.3    Family history of cervical cancer Z80.49    GERD (gastroesophageal reflux disease) K21.9    IBS (irritable bowel syndrome) K58.9    Migraine headache G43.909    Hyperlipidemia E78.5    Multiple sclerosis, relapsing-remitting (HCC) G35    Overweight E66.3    Recurrent major depressive disorder, in partial remission (HCC) F33.41    Mild intermittent asthma without complication A54.22     Patient Active Problem List    Diagnosis Date Noted    Migraine headache 03/04/2021    Hyperlipidemia 03/04/2021    Mild intermittent asthma without complication 07/93/9286    GERD (gastroesophageal reflux disease) 10/05/2020    Overweight 09/23/2020    Family history of breast cancer 02/19/2020    Family history of cervical cancer 12/18/2018    Recurrent major depressive disorder, in partial remission ([facility] 12/02/2018    Anxiety 10/23/2017    Atherosclerosis of coronary artery 10/23/2017    Depression 10/23/2017    IBS (irritable bowel syndrome) 10/23/2017    Multiple sclerosis, relapsing-remitting ([facility] 10/23/2017     Current Outpatient Medications   Medication Sig Dispense Refill    loratadine (Claritin) 10 mg tablet Take 10 mg by mouth.  baclofen (LIORESAL) 10 mg tablet Take 10 mg by mouth two (2) times a day.  aspirin delayed-release 81 mg tablet Take 81 mg by mouth daily.  predniSONE (DELTASONE) 10 mg tablet Take 3 tablets on day 1 and 2 (30 mg), then take 2 tablets on day 3,4 (20 mg), then take one tablet on day 5 and 6, then proceed to take 1/2 tablet (5 mg) on day 7 and 8 13 Tablet 0    fluticasone propionate (FLONASE) 50 mcg/actuation nasal spray Use 2 spray(s) in each nostril once daily 16 g 0    citalopram (CELEXA) 20 mg tablet Take 1 Tablet by mouth daily. 90 Tablet 1    butalbital-acetaminophen-caffeine (FIORICET, ESGIC) -40 mg per tablet Take 1 Tablet by mouth every six (6) hours as needed for Headache. 60 Tablet 2    multivitamin with iron tablet Take 1 Tablet by mouth daily.  promethazine (PHENERGAN) 25 mg tablet Take 1 Tablet by mouth every six (6) hours as needed for Nausea. 30 Tablet 2    atorvastatin (LIPITOR) 20 mg tablet Take 1 Tablet by mouth daily. 90 Tablet 3    buPROPion XL (WELLBUTRIN XL) 300 mg XL tablet Take 1 Tablet by mouth Every morning.  90 Tablet 3    Calcium-Cholecalciferol, D3, (Calcium 600 with Vitamin D3) 600 mg(1,500mg) -400 unit chew Take 1 Tab by mouth daily. 90 Tab 3    Asmanex  mcg/actuation HFAA inhaler INHALE 1 PUFF BY MOUTH TWICE DAILY      teriflunomide (AUBAGIO) 14 mg tablet Take 1 Tab by mouth daily. Allergies   Allergen Reactions    Opioids-Methadone And Related Other (comments)     Highly sensitive     Past Medical History:   Diagnosis Date    Asthma in adult, mild intermittent, uncomplicated     Depression     GERD (gastroesophageal reflux disease)     Hyperlipemia     IBS (irritable bowel syndrome)     Major depressive disorder, recurrent episode, in partial remission (HCC)     Migraine headache     Multiple sclerosis, relapsing-remitting ([facility]     Overweight      Past Surgical History:   Procedure Laterality Date    HX CORONARY STENT PLACEMENT  2003    HX HEMORRHOIDECTOMY  2005    HX TUBAL LIGATION  1987     Family History   Problem Relation Age of Onset    Cancer Mother         Cervical, Lung and Breast    Breast Cancer Mother     Heart Disease Father      Social History     Tobacco Use    Smoking status: Former Smoker     Types: Cigarettes    Smokeless tobacco: Never Used   Substance Use Topics    Alcohol use: Not Currently       Review of Systems   Constitutional: Negative for chills and fever. HENT: Positive for congestion. Eyes: Negative. Respiratory: Positive for cough and shortness of breath. Negative for hemoptysis, sputum production and wheezing. Cardiovascular: Negative for palpitations and leg swelling. Gastrointestinal: Negative. Genitourinary: Negative. Musculoskeletal: Negative. Skin: Negative for rash. Neurological: Negative. All other systems reviewed and are negative.       Objective:     Patient-Reported Vitals 6/7/2022   Patient-Reported Weight 152.5      General: alert, cooperative, no distress   Mental  status: normal mood, behavior, speech, dress, motor activity, and thought processes, able to follow commands   HENT: NCAT   Neck: no visualized mass   Resp: no respiratory distress   Neuro: no gross deficits   Skin: no discoloration or lesions of concern on visible areas   Psychiatric: normal affect, consistent with stated mood, no evidence of hallucinations     Additional exam findings: We discussed the expected course, resolution and complications of the diagnosis(es) in detail. Medication risks, benefits, costs, interactions, and alternatives were discussed as indicated. I advised her to contact the office if her condition worsens, changes or fails to improve as anticipated. She expressed understanding with the diagnosis(es) and plan. Saintclair [place], was evaluated through a synchronous (real-time) audio-video encounter. The patient (or guardian if applicable) is aware that this is a billable service, which includes applicable co-pays. This Virtual Visit was conducted with patient's (and/or legal guardian's) consent. The visit was conducted pursuant to the emergency declaration under the [address] [place] [address], [address] [place] [address] authority and the [employer] Act. Patient identification was verified, and a caregiver was present when appropriate. The patient was located at: Home: [address] [place] [phone]  The provider was located at:  Facility (Appt Department): [address] [place] [id number]        Roseann Aleman PA-C

## 2022-06-07 NOTE — PROGRESS NOTES
Rose Ocampo  Identified pt with two pt identifiers(name and ). Chief Complaint   Patient presents with    Chest Pain     tight       Reviewed record In preparation for visit and have obtained necessary documentation. 1. Have you been to the ER, urgent care clinic or hospitalized since your last visit? No     2. Have you seen or consulted any other health care providers outside of the [address] [place] [address] since your last visit? Include any pap smears or colon screening. No    Vitals reviewed with provider. Health Maintenance reviewed: There are no preventive care reminders to display for this patient. safe   Wt Readings from Last 3 Encounters:   22 152 lb 3.2 oz (69 kg)   21 171 lb (77.6 kg)   for you to go home?  [place] Come                           Vitals:    22 1500   PainSc:   0 - No pain    No Help Needed    No Help Needed            Learning Assessment 3/4/2021   PRIMARY LEARNER Patient   HIGHEST LEVEL OF EDUCATION - PRIMARY LEARNER  2 YEARS OF COLLEGE   BARRIERS PRIMARY LEARNER NONE   PRIMARY LANGUAGE ENGLISH   LEARNER PREFERENCE PRIMARY READING   ANSWERED BY Patient   RELATIONSHIP SELF                  Climbing a flight of stair  3 most recent PHQ Screens 2022   PHQ Not Done -   Little interest or pleasure in doing things Several days   Feeling down, depressed, irritable, or hopeless More than half the days   Total Score PHQ 2 3   Trouble falling or staying asleep, or sleeping too much More than half the days   Feeling tired or having little energy Not at all   Poor appetite, weight loss, or overeating Not at all   Feeling bad about yourself - or that you are a failure or have let yourself or your family down Not at all   Trouble concentrating on things such as school, work, reading, or watching TV Not at all   Moving or speaking so slowly that other people could have noticed; or the opposite being so fidgety that others notice Not at all   Thoughts of being better off dead, or hurting yourself in some way Not at all   PHQ 9 Score 5

## 2022-06-30 DIAGNOSIS — M25.562 LEFT KNEE PAIN, UNSPECIFIED CHRONICITY: Primary | ICD-10-CM

## 2022-07-05 ENCOUNTER — HOSPITAL ENCOUNTER (OUTPATIENT)
Dept: GENERAL RADIOLOGY | Age: 67
Discharge: HOME | End: 2022-07-05
Payer: MEDICARE

## 2022-07-05 ENCOUNTER — OFFICE VISIT (OUTPATIENT)
Dept: ORTHOPEDIC SURGERY | Age: 67
End: 2022-07-05
Payer: MEDICARE

## 2022-07-05 VITALS
HEART RATE: 77 BPM | DIASTOLIC BLOOD PRESSURE: 86 MMHG | TEMPERATURE: 97.4 F | BODY MASS INDEX: 26.85 KG/M2 | WEIGHT: 156.4 LBS | SYSTOLIC BLOOD PRESSURE: 108 MMHG | OXYGEN SATURATION: 100 %

## 2022-07-05 DIAGNOSIS — M17.12 UNILATERAL PRIMARY OSTEOARTHRITIS, LEFT KNEE: Primary | ICD-10-CM

## 2022-07-05 DIAGNOSIS — M25.562 LEFT KNEE PAIN, UNSPECIFIED CHRONICITY: ICD-10-CM

## 2022-07-05 PROCEDURE — 20610 DRAIN/INJ JOINT/BURSA W/O US: CPT | Performed by: ORTHOPAEDIC SURGERY

## 2022-07-05 PROCEDURE — 73564 X-RAY EXAM KNEE 4 OR MORE: CPT

## 2022-07-05 PROCEDURE — 99203 OFFICE O/P NEW LOW 30 MIN: CPT | Performed by: ORTHOPAEDIC SURGERY

## 2022-07-05 PROCEDURE — 1101F PT FALLS ASSESS-DOCD LE1/YR: CPT | Performed by: ORTHOPAEDIC SURGERY

## 2022-07-05 PROCEDURE — G8399 PT W/DXA RESULTS DOCUMENT: HCPCS | Performed by: ORTHOPAEDIC SURGERY

## 2022-07-05 PROCEDURE — G9899 SCRN MAM PERF RSLTS DOC: HCPCS | Performed by: ORTHOPAEDIC SURGERY

## 2022-07-05 PROCEDURE — G8536 NO DOC ELDER MAL SCRN: HCPCS | Performed by: ORTHOPAEDIC SURGERY

## 2022-07-05 PROCEDURE — 3017F COLORECTAL CA SCREEN DOC REV: CPT | Performed by: ORTHOPAEDIC SURGERY

## 2022-07-05 PROCEDURE — G9717 DOC PT DX DEP/BP F/U NT REQ: HCPCS | Performed by: ORTHOPAEDIC SURGERY

## 2022-07-05 PROCEDURE — 1090F PRES/ABSN URINE INCON ASSESS: CPT | Performed by: ORTHOPAEDIC SURGERY

## 2022-07-05 PROCEDURE — 1123F ACP DISCUSS/DSCN MKR DOCD: CPT | Performed by: ORTHOPAEDIC SURGERY

## 2022-07-05 PROCEDURE — G8417 CALC BMI ABV UP PARAM F/U: HCPCS | Performed by: ORTHOPAEDIC SURGERY

## 2022-07-05 PROCEDURE — G8427 DOCREV CUR MEDS BY ELIG CLIN: HCPCS | Performed by: ORTHOPAEDIC SURGERY

## 2022-07-05 RX ORDER — BETAMETHASONE SODIUM PHOSPHATE AND BETAMETHASONE ACETATE 3; 3 MG/ML; MG/ML
6 INJECTION, SUSPENSION INTRA-ARTICULAR; INTRALESIONAL; INTRAMUSCULAR; SOFT TISSUE ONCE
Status: COMPLETED | OUTPATIENT
Start: 2022-07-05 | End: 2022-07-05

## 2022-07-05 RX ADMIN — BETAMETHASONE SODIUM PHOSPHATE AND BETAMETHASONE ACETATE 6 MG: 3; 3 INJECTION, SUSPENSION INTRA-ARTICULAR; INTRALESIONAL; INTRAMUSCULAR; SOFT TISSUE at 08:57

## 2022-07-05 NOTE — PROGRESS NOTES
7/5/2022    Chief Complaint: Left knee pain    Assessment: Osteoarthritis left knee    Plan: This patient and I did discuss the many options in treating knee osteoarthritis. We did discuss that we could continue to seek out nonoperative modalities, such as: NSAIDs, oral and topical analgesics, knee injections, knee braces, physical therapy, stretching, strengthening, and weight loss strategies, activity modification, ambulatory assistive devices. The patient stated their understanding with this, and would like to proceed with nonsurgical management in the form of injection. HPI: This is a 73 y.o. female who complains of left knee pain. Onset was gradual.  The patient has had activity dependent pain for years. The patient has tried activity modification, physical therapy exercises, injections have worked years ago. The pain is in the anterior knee, it is severe at times in intensity. The patient feels unstable with the knee, fears falling, and has significant limitation with activities of daily living, recreation, and walks with a cane. Past Medical History:   Diagnosis Date    Asthma in adult, mild intermittent, uncomplicated     Depression     GERD (gastroesophageal reflux disease)     Hyperlipemia     IBS (irritable bowel syndrome)     Major depressive disorder, recurrent episode, in partial remission ([facility]     Migraine headache     Multiple sclerosis, relapsing-remitting ([facility]     Overweight        Past Surgical History:   Procedure Laterality Date    HX CORONARY STENT PLACEMENT  2003    HX HEMORRHOIDECTOMY  2005    HX TUBAL LIGATION  1987       Current Outpatient Medications on File Prior to Visit   Medication Sig Dispense Refill    loratadine (Claritin) 10 mg tablet Take 10 mg by mouth.  baclofen (LIORESAL) 10 mg tablet Take 10 mg by mouth two (2) times a day.  aspirin delayed-release 81 mg tablet Take 81 mg by mouth daily.       predniSONE (DELTASONE) 10 mg tablet Take 3 tablets on day 1 and 2 (30 mg), then take 2 tablets on day 3,4 (20 mg), then take one tablet on day 5 and 6, then proceed to take 1/2 tablet (5 mg) on day 7 and 8 13 Tablet 0    fluticasone propionate (FLONASE) 50 mcg/actuation nasal spray Use 2 spray(s) in each nostril once daily 16 g 0    citalopram (CELEXA) 20 mg tablet Take 1 Tablet by mouth daily. 90 Tablet 1    butalbital-acetaminophen-caffeine (FIORICET, ESGIC) -40 mg per tablet Take 1 Tablet by mouth every six (6) hours as needed for Headache. 60 Tablet 2    multivitamin with iron tablet Take 1 Tablet by mouth daily.  promethazine (PHENERGAN) 25 mg tablet Take 1 Tablet by mouth every six (6) hours as needed for Nausea. 30 Tablet 2    atorvastatin (LIPITOR) 20 mg tablet Take 1 Tablet by mouth daily. 90 Tablet 3    buPROPion XL (WELLBUTRIN XL) 300 mg XL tablet Take 1 Tablet by mouth Every morning. 90 Tablet 3    Calcium-Cholecalciferol, D3, (Calcium 600 with Vitamin D3) 600 mg(1,500mg) -400 unit chew Take 1 Tab by mouth daily. 90 Tab 3    Asmanex  mcg/actuation HFAA inhaler INHALE 1 PUFF BY MOUTH TWICE DAILY      teriflunomide (AUBAGIO) 14 mg tablet Take 1 Tab by mouth daily. No current facility-administered medications on file prior to visit.        Allergies   Allergen Reactions    Opioids-Methadone And Related Other (comments)     Highly sensitive       Family History   Problem Relation Age of Onset    Cancer Mother         Cervical, Lung and Breast    Breast Cancer Mother     Heart Disease Father        Social History     Socioeconomic History    Marital status:    Tobacco Use    Smoking status: Former Smoker     Types: Cigarettes    Smokeless tobacco: Never Used   Vaping Use    Vaping Use: Former   Substance and Sexual Activity    Alcohol use: Not Currently    Drug use: Never     Social Determinants of Health     Physical Activity: Insufficiently Active    Days of Exercise per Week: 3 days   [employer] Exercise per Session: 30 min         Review of Systems:       General: Denies headache, lethargy, fever, weight loss  Ears/Nose/Throat: Denies ear discharge, drainage, nosebleeds, hoarse voice, dental problems  Cardiovascular: Denies chest pain, shortness of breath  Lungs: Denies chest pain, breathing problems, wheezing, pneumonia  Stomach: Denies stomach pain, heartburn, constipation, irritable bowel  Skin: Denies rash, sores, open wounds  Musculoskeletal: Admits to knee pain  Genitourinary: Denies dysuria, hematuria, polyuria  Gastrointestinal: Denies constipation, obstipation, diarrhea  Neurological: Denies changes in sight, smell, hearing, taste, seizures. Denies loss of consciousness. Psychiatric: Denies depression, sleep pattern changes, anxiety, change in personality  Endocrine: Denies mood swings, heat or cold intolerance  Hematologic/Lymphatic: Denies anemia, purpura, petechia  Allergic/Immunologic: Denies swelling of throat, pain or swelling at lymph nodes      Physical Examination:    Visit Vitals  /86   Pulse 77   Temp 97.4 °F (36.3 °C)   Wt 156 lb 6.4 oz (70.9 kg)   SpO2 100%   BMI 26.85 kg/m²        General: AOX3, no apparent distress  Psychiatric: mood and affect appropriate  Lungs: breathing is symmetric and unlabored bilaterally  Heart: regular rate and rhythm  Abdomen: no guarding  Head: normocephalic, atraumatic  Skin: No significant abnormalities, good turgor  Sensation intact to light touch: L1-S1 dermatomes  Muscular exam: 5/5 strength in all major muscle groups unless noted in specialty exam.    Extremities:      Left upper extremity: Full active and passive range of motion without pain, deformity, no open wound, strength 5/5 in all major muscle groups. Right upper extremity: Full active and passive range of motion without pain, deformity, no open wound, strength 5/5 in all major muscle groups.     Right lower extremity: Full active and passive range of motion without pain, deformity, no open wound, strength 5/5 in all major muscle groups. Left lower extremity:  No deformity is noted. Range of motion of the knee is 0-120. Ligamentous testing of the knee indicates stability of the the MCL, LCL, PCL, and ACL. Lachman's, anterior and posterior drawer tests are specifically negative. Medial joint line tenderness to palpation is noted. Popliteal area is unremarkable. 1+  for effusion. No patellar crepitus. Patella tracks centrally . Pivot shift is negative. Strength testing is indicative of 5/5 strength at hip flexion, extension, knee flexion and extension, tibialis anterior, EHL, and FHL. Sensation is intact to light touch in the L1-S1 dermatomes. Capillary refill is less than 2 seconds in the toes. Diagnostics:    Pertinent Diagnostics:  Xrays are available of the left knee, they indicate moderate patellofemoral osteoarthritis of the knee joint, no significant other findings, no other osseus abnormalities, fractures, or dislocations. Ms. Vinay Iqbal has a reminder for a \"due or due soon\" health maintenance. I have asked that she contact her primary care provider for follow-up on this health maintenance. Date of Procedure: 7/5/2022  PROCEDURE NOTE: Left knee injection of Celestone    Consent was obtained from the patient. The correct site was identified after confirmation with the patient. Following identification and confirmation of the correct site with the patient, the superolateral left knee was prepped in the normal sterile fashion. A local anesthetic of 1% lidocaine without epinephrine was then administered to the local tissues. Following, an injection of a mixture of  6 mg Celestone and 1% lidocaine without epinephrine was administered to the left knee. The needle was then withdrawn and the injection site dressed with a sterile bandage at the conclusion. The procedure was well tolerated by the patient. No immediate adverse reactions were noted.   Post injection instructions were given.

## 2022-07-05 NOTE — LETTER
7/5/2022    Patient: Anamaria Campa   YOB: 1955   Date of Visit: 7/5/2022     Mar Layton PA-C  [address]  [facility] [address]    Dear Mar Layton PA-C,      Thank you for referring Ms. Anamaria Campa to [facility] for evaluation. My notes for this consultation are attached. If you have questions, please do not hesitate to call me. I look forward to following your patient along with you.       Sincerely,    Yves Bansal, DO

## 2022-07-06 ENCOUNTER — VIRTUAL VISIT (OUTPATIENT)
Dept: INTERNAL MEDICINE CLINIC | Age: 67
End: 2022-07-06
Payer: MEDICARE

## 2022-07-06 DIAGNOSIS — F33.1 MODERATE EPISODE OF RECURRENT MAJOR DEPRESSIVE DISORDER (HCC): Primary | ICD-10-CM

## 2022-07-06 PROCEDURE — 3017F COLORECTAL CA SCREEN DOC REV: CPT | Performed by: PHYSICIAN ASSISTANT

## 2022-07-06 PROCEDURE — G8399 PT W/DXA RESULTS DOCUMENT: HCPCS | Performed by: PHYSICIAN ASSISTANT

## 2022-07-06 PROCEDURE — G8427 DOCREV CUR MEDS BY ELIG CLIN: HCPCS | Performed by: PHYSICIAN ASSISTANT

## 2022-07-06 PROCEDURE — G9899 SCRN MAM PERF RSLTS DOC: HCPCS | Performed by: PHYSICIAN ASSISTANT

## 2022-07-06 PROCEDURE — 1101F PT FALLS ASSESS-DOCD LE1/YR: CPT | Performed by: PHYSICIAN ASSISTANT

## 2022-07-06 PROCEDURE — 1090F PRES/ABSN URINE INCON ASSESS: CPT | Performed by: PHYSICIAN ASSISTANT

## 2022-07-06 PROCEDURE — 99213 OFFICE O/P EST LOW 20 MIN: CPT | Performed by: PHYSICIAN ASSISTANT

## 2022-07-06 PROCEDURE — 1123F ACP DISCUSS/DSCN MKR DOCD: CPT | Performed by: PHYSICIAN ASSISTANT

## 2022-07-06 PROCEDURE — G9717 DOC PT DX DEP/BP F/U NT REQ: HCPCS | Performed by: PHYSICIAN ASSISTANT

## 2022-07-06 NOTE — PROGRESS NOTES
Paulina Sanchez  Identified pt with two pt identifiers(name and ). Chief Complaint   Patient presents with    Depression       Reviewed record In preparation for visit and have obtained necessary documentation. 1. Have you been to the ER, urgent care clinic or hospitalized since your last visit? No     2. Have you seen or consulted any other health care providers outside of the [address] [place] [address] since your last visit? Include any pap smears or colon screening. No    Vitals reviewed with provider. Health Maintenance reviewed: There are no preventive care reminders to display for this patient.        Wt Readings from Last 3 Encounters:   22 156 lb 6.4 oz (70.9 kg)   22 152 lb 3.2 oz (69 kg)   21 171 lb (77.6 kg)        Temp Readings from Last 3 Encounters:   22 97.4 °F (36.3 °C)   22 97.5 °F (36.4 °C) (Oral)   21 98.4 °F (36.9 °C) (Oral)        BP Readings from Last 3 Encounters:   22 108/86   22 115/80   21 110/75        Pulse Readings from Last 3 Encounters:   22 77   22 77   21 77        Vitals:    22 1100   PainSc:   0 - No pain          Learning Assessment:   :       Learning Assessment 3/4/2021   PRIMARY LEARNER Patient   HIGHEST LEVEL OF EDUCATION - PRIMARY LEARNER  2 YEARS OF COLLEGE   BARRIERS PRIMARY LEARNER NONE   PRIMARY LANGUAGE ENGLISH   LEARNER PREFERENCE PRIMARY READING   ANSWERED BY Patient   RELATIONSHIP SELF        Depression Screening:   :       3 most recent PHQ Screens 2022   PHQ Not Done -   Little interest or pleasure in doing things Nearly every day   Feeling down, depressed, irritable, or hopeless Nearly every day   Total Score PHQ 2 6   Trouble falling or staying asleep, or sleeping too much Nearly every day   Feeling tired or having little energy Nearly every day   Poor appetite, weight loss, or overeating Several days   Feeling bad about yourself - or that you are a failure or have let yourself or your family down More than half the days   Trouble concentrating on things such as school, work, reading, or watching TV More than half the days   Moving or speaking so slowly that other people could have noticed; or the opposite being so fidgety that others notice Nearly every day   Thoughts of being better off dead, or hurting yourself in some way Not at all   PHQ 9 Score 20        Fall Risk Assessment:   :       Fall Risk Assessment, last 12 mths 2/22/2022   Able to walk? Yes   Fall in past 12 months? 0   Do you feel unsteady? 0   Are you worried about falling 0        Abuse Screening:   :       Abuse Screening Questionnaire 6/7/2022 3/4/2021   Do you ever feel afraid of your partner? N N   Are you in a relationship with someone who physically or mentally threatens you? N N   Is it safe for you to go home?  Y Y        ADL Screening:   :       ADL Assessment 8/20/2021   Feeding yourself No Help Needed   Getting from bed to chair No Help Needed   Getting dressed No Help Needed   Bathing or showering No Help Needed   Walk across the room (includes cane/walker) No Help Needed   Using the telphone No Help Needed   Taking your medications No Help Needed   Preparing meals No Help Needed   Managing money (expenses/bills) No Help Needed   Moderately strenuous housework (laundry) No Help Needed   Shopping for personal items (toiletries/medicines) No Help Needed   Shopping for groceries No Help Needed   Driving No Help Needed   Climbing a flight of stairs No Help Needed   Getting to places beyond walking distances No Help Needed

## 2022-07-06 NOTE — PROGRESS NOTES
Paulina Sanchez is a 73 y.o. female who was seen by synchronous (real-time) audio-video technology on 7/6/2022 for Depression        Assessment & Plan:   Diagnoses and all orders for this visit:    1. Moderate episode of recurrent major depressive disorder (HCC)    Other orders  -     vortioxetine (Trintellix) 10 mg tablet; Take 1 Tablet by mouth daily. Plan to continue Wellbutrin and change Celexa 20 mg to 10 mg Trintellix. Low risk combination use with wellbutrin so will keep trintellix to 10 mg. If ineffective or causes side effects will try effexor. Plan to follow up with psych and advised to go through [facility] to fine tertiary modalities like ED therapy     Follow up in August as planned or sooner. The complexity of medical decision making for this visit is moderate     I spent at least 20 minutes on this visit with this established patient. 712  Subjective:   Patient presents via [employer] virtual visit for increasing depression. She has a long standing history of depression. In the last 2 years she has noticed her symptoms getting consistently worse. She has been isolating herself more. She notes the depression has worsened since the pandemic and she has been more immobile due to MS. She previously had more problems with depression in winter but notes this year it has been continuing. She has worked with psychiatry in the past and notes she had been treated for hypomanic bipolar depression in the past but has been stable on Wellbutrin and Celexa for about 7 years. She has a family hx of bipolar and schizophrenia. 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 She notes her best friend checks on her twice a day. She has no HI or SI. She has called around to psychiatry and has been put on waiting lists and call back lists. She is considering [facility] as they have Eye movement desensitization and reprocessing therapy.        Prior to Admission medications    Medication Sig Start Date End Date Taking? Authorizing Provider   loratadine (Claritin) 10 mg tablet Take 10 mg by mouth. Yes Provider, Historical   baclofen (LIORESAL) 10 mg tablet Take 10 mg by mouth two (2) times a day. 5/23/22  Yes Provider, Historical   aspirin delayed-release 81 mg tablet Take 81 mg by mouth daily. Yes Provider, Historical   fluticasone propionate (FLONASE) 50 mcg/actuation nasal spray Use 2 spray(s) in each nostril once daily 5/23/22  Yes Adriana Diaz PA-C   citalopram (CELEXA) 20 mg tablet Take 1 Tablet by mouth daily. 2/28/22  Yes Adriana Diaz PA-C   multivitamin with iron tablet Take 1 Tablet by mouth daily. Yes Provider, Historical   atorvastatin (LIPITOR) 20 mg tablet Take 1 Tablet by mouth daily. 8/20/21  Yes Adriana Diaz PA-C   buPROPion XL (WELLBUTRIN XL) 300 mg XL tablet Take 1 Tablet by mouth Every morning. 8/20/21  Yes Adriana Diaz PA-C   Calcium-Cholecalciferol, D3, (Calcium 600 with Vitamin D3) 600 mg(1,500mg) -400 unit chew Take 1 Tab by mouth daily. 3/8/21  Yes Adriana Diaz PA-C   Asmanex  mcg/actuation HFAA inhaler INHALE 1 PUFF BY MOUTH TWICE DAILY 2/23/21  Yes Provider, Historical   teriflunomide (AUBAGIO) 14 mg tablet Take 1 Tab by mouth daily. Yes Provider, Historical   predniSONE (DELTASONE) 10 mg tablet Take 3 tablets on day 1 and 2 (30 mg), then take 2 tablets on day 3,4 (20 mg), then take one tablet on day 5 and 6, then proceed to take 1/2 tablet (5 mg) on day 7 and 8 6/7/22   Adriana Diaz PA-C   butalbital-acetaminophen-caffeine (FIORICET, ESGIC) -40 mg per tablet Take 1 Tablet by mouth every six (6) hours as needed for Headache. 10/21/21   Adriana Diaz PA-C   promethazine (PHENERGAN) 25 mg tablet Take 1 Tablet by mouth every six (6) hours as needed for Nausea.  8/20/21   Adriana Diaz PA-C     Patient Active Problem List   Diagnosis Code    Anxiety F41.9    Atherosclerosis of coronary artery I25.10    Depression F32. A    Family history of breast cancer Z80.3    Family history of cervical cancer Z80.49    GERD (gastroesophageal reflux disease) K21.9    IBS (irritable bowel syndrome) K58.9    Migraine headache G43.909    Hyperlipidemia E78.5    Multiple sclerosis, relapsing-remitting (HCC) G35    Overweight E66.3    Recurrent major depressive disorder, in partial remission (HCC) F33.41    Mild intermittent asthma without complication W77.82     Patient Active Problem List    Diagnosis Date Noted    Migraine headache 03/04/2021    Hyperlipidemia 03/04/2021    Mild intermittent asthma without complication 83/37/0055    GERD (gastroesophageal reflux disease) 10/05/2020    Overweight 09/23/2020    Family history of breast cancer 02/19/2020    Family history of cervical cancer 12/18/2018    Recurrent major depressive disorder, in partial remission ([facility] [address] 12/02/2018    Anxiety 10/23/2017    Atherosclerosis of coronary artery 10/23/2017    Depression 10/23/2017    IBS (irritable bowel syndrome) 10/23/2017    Multiple sclerosis, relapsing-remitting ([facility] 10/23/2017     Current Outpatient Medications   Medication Sig Dispense Refill    loratadine (Claritin) 10 mg tablet Take 10 mg by mouth.  baclofen (LIORESAL) 10 mg tablet Take 10 mg by mouth two (2) times a day.  aspirin delayed-release 81 mg tablet Take 81 mg by mouth daily.  fluticasone propionate (FLONASE) 50 mcg/actuation nasal spray Use 2 spray(s) in each nostril once daily 16 g 0    citalopram (CELEXA) 20 mg tablet Take 1 Tablet by mouth daily. 90 Tablet 1    multivitamin with iron tablet Take 1 Tablet by mouth daily.  atorvastatin (LIPITOR) 20 mg tablet Take 1 Tablet by mouth daily. 90 Tablet 3    buPROPion XL (WELLBUTRIN XL) 300 mg XL tablet Take 1 Tablet by mouth Every morning.  90 Tablet 3    Calcium-Cholecalciferol, D3, (Calcium 600 with Vitamin D3) 600 mg(1,500mg) -400 unit chew Take 1 Tab by mouth daily. 90 Tab 3    Asmanex  mcg/actuation HFAA inhaler INHALE 1 PUFF BY MOUTH TWICE DAILY      teriflunomide (AUBAGIO) 14 mg tablet Take 1 Tab by mouth daily.  predniSONE (DELTASONE) 10 mg tablet Take 3 tablets on day 1 and 2 (30 mg), then take 2 tablets on day 3,4 (20 mg), then take one tablet on day 5 and 6, then proceed to take 1/2 tablet (5 mg) on day 7 and 8 13 Tablet 0    butalbital-acetaminophen-caffeine (FIORICET, ESGIC) -40 mg per tablet Take 1 Tablet by mouth every six (6) hours as needed for Headache. 60 Tablet 2    promethazine (PHENERGAN) 25 mg tablet Take 1 Tablet by mouth every six (6) hours as needed for Nausea. 30 Tablet 2     Allergies   Allergen Reactions    Opioids-Methadone And Related Other (comments)     Highly sensitive     Past Medical History:   Diagnosis Date    Asthma in adult, mild intermittent, uncomplicated     Depression     GERD (gastroesophageal reflux disease)     Hyperlipemia     IBS (irritable bowel syndrome)     Major depressive disorder, recurrent episode, in partial remission (HCC)     Migraine headache     [facility] sclerosis, relapsing-remitting ([facility]     Overweight      Past Surgical History:   Procedure Laterality Date    HX CORONARY STENT PLACEMENT  2003    HX HEMORRHOIDECTOMY  2005    HX TUBAL LIGATION  1987     Family History   Problem Relation Age of Onset    Cancer Mother         Cervical, Lung and Breast    Breast Cancer Mother     Heart Disease Father      Social History     Tobacco Use    Smoking status: Former Smoker     Types: Cigarettes    Smokeless tobacco: Never Used   Substance Use Topics    Alcohol use: Not Currently       Review of Systems   Constitutional: Negative for chills, fever, malaise/fatigue and weight loss. HENT: Negative for ear pain, hearing loss and sore throat. Respiratory: Negative for cough and shortness of breath. Cardiovascular: Negative for leg swelling.    Gastrointestinal: Negative for abdominal pain, blood in stool, constipation, diarrhea, heartburn, melena, nausea and vomiting. Genitourinary: Negative for dysuria and hematuria. Skin: Negative for rash. Neurological: Negative for weakness and headaches. Psychiatric/Behavioral: Positive for depression. Negative for substance abuse and suicidal ideas. Objective:     Patient-Reported Vitals 7/6/2022   Patient-Reported Weight 154.5      General: alert, cooperative, no distress   Mental  status: normal mood, behavior, speech, dress, motor activity, and thought processes, able to follow commands   HENT: NCAT   Neck: no visualized mass   Resp: no respiratory distress   Neuro: no gross deficits   Skin: no discoloration or lesions of concern on visible areas   Psychiatric: normal affect, consistent with stated mood, no evidence of hallucinations     Additional exam findings: We discussed the expected course, resolution and complications of the diagnosis(es) in detail. Medication risks, benefits, costs, interactions, and alternatives were discussed as indicated. I advised her to contact the office if her condition worsens, changes or fails to improve as anticipated. She expressed understanding with the diagnosis(es) and plan. Mike Joseph, was evaluated through a synchronous (real-time) audio-video encounter. The patient (or guardian if applicable) is aware that this is a billable service, which includes applicable co-pays. This Virtual Visit was conducted with patient's (and/or legal guardian's) consent. The visit was conducted pursuant to the emergency declaration under the [address] [place] [address], [address] [place] [address] authority and the [employer] General Act. Patient identification was verified, and a caregiver was present when appropriate. The patient was located at: Home: [address] [place] [id number]  The provider was located at:  Facility (Appt Department): [address] [id number]        Lyssa Alvarado PA-C

## 2022-08-03 RX ORDER — FLUTICASONE PROPIONATE 50 MCG
SPRAY, SUSPENSION (ML) NASAL
Qty: 16 G | Refills: 0 | Status: SHIPPED | OUTPATIENT
Start: 2022-08-03 | End: 2022-10-06 | Stop reason: SDUPTHER

## 2022-08-22 ENCOUNTER — OFFICE VISIT (OUTPATIENT)
Dept: INTERNAL MEDICINE CLINIC | Age: 67
End: 2022-08-22
Payer: MEDICARE

## 2022-08-22 VITALS
SYSTOLIC BLOOD PRESSURE: 99 MMHG | WEIGHT: 157 LBS | OXYGEN SATURATION: 96 % | TEMPERATURE: 98.3 F | RESPIRATION RATE: 12 BRPM | HEIGHT: 64 IN | BODY MASS INDEX: 26.8 KG/M2 | HEART RATE: 80 BPM | DIASTOLIC BLOOD PRESSURE: 66 MMHG

## 2022-08-22 DIAGNOSIS — E78.5 HYPERLIPIDEMIA, UNSPECIFIED HYPERLIPIDEMIA TYPE: ICD-10-CM

## 2022-08-22 DIAGNOSIS — J45.20 MILD INTERMITTENT ASTHMA WITHOUT COMPLICATION: ICD-10-CM

## 2022-08-22 DIAGNOSIS — Z00.00 MEDICARE ANNUAL WELLNESS VISIT, SUBSEQUENT: Primary | ICD-10-CM

## 2022-08-22 DIAGNOSIS — F31.81 BIPOLAR 2 DISORDER (HCC): ICD-10-CM

## 2022-08-22 DIAGNOSIS — I25.10 ATHEROSCLEROSIS OF NATIVE CORONARY ARTERY OF NATIVE HEART WITHOUT ANGINA PECTORIS: ICD-10-CM

## 2022-08-22 DIAGNOSIS — G35 MULTIPLE SCLEROSIS, RELAPSING-REMITTING (HCC): ICD-10-CM

## 2022-08-22 DIAGNOSIS — F33.1 MODERATE EPISODE OF RECURRENT MAJOR DEPRESSIVE DISORDER (HCC): ICD-10-CM

## 2022-08-22 PROCEDURE — G8536 NO DOC ELDER MAL SCRN: HCPCS | Performed by: PHYSICIAN ASSISTANT

## 2022-08-22 PROCEDURE — G9717 DOC PT DX DEP/BP F/U NT REQ: HCPCS | Performed by: PHYSICIAN ASSISTANT

## 2022-08-22 PROCEDURE — 1101F PT FALLS ASSESS-DOCD LE1/YR: CPT | Performed by: PHYSICIAN ASSISTANT

## 2022-08-22 PROCEDURE — 1090F PRES/ABSN URINE INCON ASSESS: CPT | Performed by: PHYSICIAN ASSISTANT

## 2022-08-22 PROCEDURE — 99213 OFFICE O/P EST LOW 20 MIN: CPT | Performed by: PHYSICIAN ASSISTANT

## 2022-08-22 PROCEDURE — 3017F COLORECTAL CA SCREEN DOC REV: CPT | Performed by: PHYSICIAN ASSISTANT

## 2022-08-22 PROCEDURE — G8427 DOCREV CUR MEDS BY ELIG CLIN: HCPCS | Performed by: PHYSICIAN ASSISTANT

## 2022-08-22 PROCEDURE — G9899 SCRN MAM PERF RSLTS DOC: HCPCS | Performed by: PHYSICIAN ASSISTANT

## 2022-08-22 PROCEDURE — G0439 PPPS, SUBSEQ VISIT: HCPCS | Performed by: PHYSICIAN ASSISTANT

## 2022-08-22 PROCEDURE — 1123F ACP DISCUSS/DSCN MKR DOCD: CPT | Performed by: PHYSICIAN ASSISTANT

## 2022-08-22 PROCEDURE — G8399 PT W/DXA RESULTS DOCUMENT: HCPCS | Performed by: PHYSICIAN ASSISTANT

## 2022-08-22 PROCEDURE — G8417 CALC BMI ABV UP PARAM F/U: HCPCS | Performed by: PHYSICIAN ASSISTANT

## 2022-08-22 RX ORDER — LAMOTRIGINE 100 MG/1
TABLET ORAL
COMMUNITY
Start: 2022-08-11

## 2022-08-22 RX ORDER — CITALOPRAM 20 MG/1
TABLET, FILM COATED ORAL
COMMUNITY
Start: 2022-08-11

## 2022-08-22 RX ORDER — ALPRAZOLAM 0.5 MG/1
TABLET ORAL
COMMUNITY
Start: 2022-08-11

## 2022-08-22 RX ORDER — HYDROXYZINE HYDROCHLORIDE 10 MG/1
TABLET, FILM COATED ORAL
COMMUNITY
Start: 2022-08-11

## 2022-08-22 NOTE — PROGRESS NOTES
Alta Zepeda  Identified pt with two pt identifiers(name and ). Chief Complaint   Patient presents with    Annual Wellness Visit    Cholesterol Problem    Depression       Reviewed record In preparation for visit and have obtained necessary documentation. 1. Have you been to the ER, urgent care clinic or hospitalized since your last visit? No     2. Have you seen or consulted any other health care providers outside of the [address] [place] [address] since your last visit? Include any pap smears or colon screening. No    Vitals reviewed with provider.     Health Maintenance reviewed:     Health Maintenance Due   Topic    COVID-19 Vaccine (4 - Booster for Pfizer series)    Medicare Yearly Exam           Wt Readings from Last 3 Encounters:   22 157 lb (71.2 kg)   22 156 lb 6.4 oz (70.9 kg)   22 152 lb 3.2 oz (69 kg)        Temp Readings from Last 3 Encounters:   22 98.3 °F (36.8 °C) (Oral)   22 97.4 °F (36.3 °C)   22 97.5 °F (36.4 °C) (Oral)        BP Readings from Last 3 Encounters:   22 99/66   22 108/86   22 115/80        Pulse Readings from Last 3 Encounters:   22 80   22 77   22 77        Vitals:    22 0810   BP: 99/66   Pulse: 80   Resp: 12   Temp: 98.3 °F (36.8 °C)   TempSrc: Oral   SpO2: 96%   Weight: 157 lb (71.2 kg)   Height: 5' 4\" (1.626 m)   PainSc:   0 - No pain          Learning Assessment:   :       Learning Assessment 3/4/2021   PRIMARY LEARNER Patient   HIGHEST LEVEL OF EDUCATION - PRIMARY LEARNER  2 YEARS OF COLLEGE   BARRIERS PRIMARY LEARNER NONE   PRIMARY LANGUAGE ENGLISH   LEARNER PREFERENCE PRIMARY READING   ANSWERED BY Patient   RELATIONSHIP SELF        Depression Screening:   :       3 most recent PHQ Screens 2022   PHQ Not Done -   Little interest or pleasure in doing things Not at all   Feeling down, depressed, irritable, or hopeless Not at all   Total Score PHQ 2 0   Trouble falling or staying asleep, or sleeping too much -   Feeling tired or having little energy -   Poor appetite, weight loss, or overeating -   Feeling bad about yourself - or that you are a failure or have let yourself or your family down -   Trouble concentrating on things such as school, work, reading, or watching TV -   Moving or speaking so slowly that other people could have noticed; or the opposite being so fidgety that others notice -   Thoughts of being better off dead, or hurting yourself in some way -   PHQ 9 Score -        Fall Risk Assessment:   :       Fall Risk Assessment, last 12 mths 8/22/2022   Able to walk? Yes   Fall in past 12 months? 0   Do you feel unsteady? 1   Are you worried about falling 1        Abuse Screening:   :       Abuse Screening Questionnaire 8/22/2022 6/7/2022 3/4/2021   Do you ever feel afraid of your partner? N N N   Are you in a relationship with someone who physically or mentally threatens you? N N N   Is it safe for you to go home?  Y Y Y        ADL Screening:   :       ADL Assessment 8/22/2022   Feeding yourself No Help Needed   Getting from bed to chair No Help Needed   Getting dressed No Help Needed   Bathing or showering No Help Needed   Walk across the room (includes cane/walker) No Help Needed   Using the telphone No Help Needed   Taking your medications No Help Needed   Preparing meals No Help Needed   Managing money (expenses/bills) No Help Needed   Moderately strenuous housework (laundry) No Help Needed   Shopping for personal items (toiletries/medicines) No Help Needed   Shopping for groceries No Help Needed   Driving No Help Needed   Climbing a flight of stairs No Help Needed   Getting to places beyond walking distances No Help Needed

## 2022-08-22 NOTE — PATIENT INSTRUCTIONS

## 2022-08-22 NOTE — PROGRESS NOTES
This is the Subsequent Medicare Annual Wellness Exam, performed 12 months or more after the Initial AWV or the last Subsequent AWV    I have reviewed the patient's medical history in detail and updated the computerized patient record. Assessment/Plan   Education and counseling provided:  Are appropriate based on today's review and evaluation  End-of-Life planning (with patient's consent)  Screening Mammography    1. Medicare annual wellness visit, subsequent  2. Atherosclerosis of native coronary artery of native heart without angina pectoris  3. Multiple sclerosis, relapsing-remitting ([facility]  4. Mild intermittent asthma without complication  5. Moderate episode of recurrent major depressive disorder ([facility]  6. Hyperlipidemia, unspecified hyperlipidemia type       Depression Risk Factor Screening     3 most recent PHQ Screens 8/22/2022   PHQ Not Done -   Little interest or pleasure in doing things Not at all   Feeling down, depressed, irritable, or hopeless Not at all   Total Score PHQ 2 0   Trouble falling or staying asleep, or sleeping too much -   Feeling tired or having little energy -   Poor appetite, weight loss, or overeating -   Feeling bad about yourself - or that you are a failure or have let yourself or your family down -   Trouble concentrating on things such as school, work, reading, or watching TV -   Moving or speaking so slowly that other people could have noticed; or the opposite being so fidgety that others notice -   Thoughts of being better off dead, or hurting yourself in some way -   PHQ 9 Score -       Alcohol & Drug Abuse Risk Screen    Do you average more than 1 drink per night or more than 7 drinks a week:  No    On any one occasion in the past three months have you have had more than 3 drinks containing alcohol:  No          Functional Ability and Level of Safety    Hearing: Hearing is good. Activities of Daily Living:   The home contains: handrails and rugs  Patient does total self care      Ambulation: with no difficulty     Fall Risk:  Fall Risk Assessment, last 12 mths 8/22/2022   Able to walk? Yes   Fall in past 12 months? 0   Do you feel unsteady? 1   Are you worried about falling 1      Abuse Screen:  Patient is not abused       Cognitive Screening    Has your family/caregiver stated any concerns about your memory: no     Cognitive Screening: Normal - MMSE (Mini Mental Status Exam), Mini Cog Test    Health Maintenance Due     Health Maintenance Due   Topic Date Due    COVID-19 Vaccine (4 - Booster for [place] series) 02/11/2022       Patient Care Team   Patient Care Team:  Tiffany Flores PA-C as PCP - General (Physician Assistant)  Tiffany Flores PA-C as PCP - [facility] Empaneled Provider    History     Patient Active Problem List   Diagnosis Code    Anxiety F41.9    Atherosclerosis of coronary artery I25.10    Depression F32. A    Family history of breast cancer Z80.3    Family history of cervical cancer Z80.49    GERD (gastroesophageal reflux disease) K21.9    IBS (irritable bowel syndrome) K58.9    Migraine headache G43.909    Hyperlipidemia E78.5    Multiple sclerosis, relapsing-remitting (HCC) G35    Overweight E66.3    Recurrent major depressive disorder, in partial remission (HCC) F33.41    Mild intermittent asthma without complication E32.75     Past Medical History:   Diagnosis Date    Asthma in adult, mild intermittent, uncomplicated     Depression     GERD (gastroesophageal reflux disease)     Hyperlipemia     IBS (irritable bowel syndrome)     Major depressive disorder, recurrent episode, in partial remission (HCC)     Migraine headache     Multiple sclerosis, relapsing-remitting ([facility]     Overweight       Past Surgical History:   Procedure Laterality Date    HX CORONARY STENT PLACEMENT  2003    HX HEMORRHOIDECTOMY  2005    HX TUBAL LIGATION  1987     Current Outpatient Medications   Medication Sig Dispense Refill    ALPRAZolam (XANAX) 0.5 mg tablet TAKE 1/2 TO 1 (ONE-HALF TO ONE) TABLET BY MOUTH AS NEEDED PRIOR TO FLYING ON AIRPLANE      citalopram (CELEXA) 20 mg tablet TAKE 1 & 1/2 (ONE & ONE-HALF) TABLETS BY MOUTH ONCE DAILY      hydrOXYzine HCL (ATARAX) 10 mg tablet TAKE 1 TO 2 TABLETS BY MOUTH EVERY DAY AT BEDTIME AS NEEDED FOR INSOMNIA      lamoTRIgine (LaMICtal) 100 mg tablet TAKE 1 TABLET BY MOUTH EVERY DAY AT BEDTIME      fluticasone propionate (FLONASE) 50 mcg/actuation nasal spray Use 2 spray(s) in each nostril once daily 16 g 0    loratadine (CLARITIN) 10 mg tablet Take 10 mg by mouth. baclofen (LIORESAL) 10 mg tablet Take 10 mg by mouth two (2) times a day. aspirin delayed-release 81 mg tablet Take 81 mg by mouth daily. multivitamin with iron tablet Take 1 Tablet by mouth daily. atorvastatin (LIPITOR) 20 mg tablet Take 1 Tablet by mouth daily. 90 Tablet 3    buPROPion XL (WELLBUTRIN XL) 300 mg XL tablet Take 1 Tablet by mouth Every morning. 90 Tablet 3    Calcium-Cholecalciferol, D3, (Calcium 600 with Vitamin D3) 600 mg(1,500mg) -400 unit chew Take 1 Tab by mouth daily. 90 Tab 3    Asmanex  mcg/actuation HFAA inhaler INHALE 1 PUFF BY MOUTH TWICE DAILY      teriflunomide (AUBAGIO) 14 mg tablet Take 1 Tab by mouth daily.        Allergies   Allergen Reactions    Opioids-Methadone And Related Other (comments)     Highly sensitive       Family History   Problem Relation Age of Onset    Cancer Mother         Cervical, Lung and Breast    Breast Cancer Mother     Heart Disease Father      Social History     Tobacco Use    Smoking status: Former     Types: Cigarettes    Smokeless tobacco: Never   Substance Use Topics    Alcohol use: Not Currently         FARHANA Hornevalerie Melanie is a 73y.o. year old female seen in clinic today for   Chief Complaint   Patient presents with    Annual Wellness Visit    Cholesterol Problem    Depression       she is here today to follow up for Depression, MS    Hx of Depression  Currently treated with Celexa 30 mg, Wellbutrin: New Lamictal 100 mg daily, hydroxyzine for sleep   Side effects from medications: none  Current symptoms of depression: none, has had much more motivation and energy  Improved from prior state: yes    Follows with Neurology. Getting full lab panel before October follow up. Plans to dicuss treatment for LLE drop foot and concerns with falling. Does not like drop foot brace. Labs due in February. Mammogram in Spring. UTD on pap and Colon. she specifically denies any CP, SOB, HA. Dizziness, fevers, chills, N/V/D, urinary symptoms or other bowel changes. Current Outpatient Medications on File Prior to Visit   Medication Sig Dispense Refill    ALPRAZolam (XANAX) 0.5 mg tablet TAKE 1/2 TO 1 (ONE-HALF TO ONE) TABLET BY MOUTH AS NEEDED PRIOR TO FLYING ON AIRPLANE      citalopram (CELEXA) 20 mg tablet TAKE 1 & 1/2 (ONE & ONE-HALF) TABLETS BY MOUTH ONCE DAILY      hydrOXYzine HCL (ATARAX) 10 mg tablet TAKE 1 TO 2 TABLETS BY MOUTH EVERY DAY AT BEDTIME AS NEEDED FOR INSOMNIA      lamoTRIgine (LaMICtal) 100 mg tablet TAKE 1 TABLET BY MOUTH EVERY DAY AT BEDTIME      fluticasone propionate (FLONASE) 50 mcg/actuation nasal spray Use 2 spray(s) in each nostril once daily 16 g 0    loratadine (CLARITIN) 10 mg tablet Take 10 mg by mouth. baclofen (LIORESAL) 10 mg tablet Take 10 mg by mouth two (2) times a day. aspirin delayed-release 81 mg tablet Take 81 mg by mouth daily. multivitamin with iron tablet Take 1 Tablet by mouth daily. atorvastatin (LIPITOR) 20 mg tablet Take 1 Tablet by mouth daily. 90 Tablet 3    buPROPion XL (WELLBUTRIN XL) 300 mg XL tablet Take 1 Tablet by mouth Every morning. 90 Tablet 3    Calcium-Cholecalciferol, D3, (Calcium 600 with Vitamin D3) 600 mg(1,500mg) -400 unit chew Take 1 Tab by mouth daily. 90 Tab 3    Asmanex  mcg/actuation HFAA inhaler INHALE 1 PUFF BY MOUTH TWICE DAILY      teriflunomide (AUBAGIO) 14 mg tablet Take 1 Tab by mouth daily. [DISCONTINUED] vortioxetine (Trintellix) 10 mg tablet Take 1 Tablet by mouth daily. 30 Tablet 5     No current facility-administered medications on file prior to visit.          Allergies   Allergen Reactions    Opioids-Methadone And Related Other (comments)     Highly sensitive     Past Medical History:   Diagnosis Date    Asthma in adult, mild intermittent, uncomplicated     Depression     GERD (gastroesophageal reflux disease)     Hyperlipemia     IBS (irritable bowel syndrome)     Major depressive disorder, recurrent episode, in partial remission (HCC)     Migraine headache     Multiple sclerosis, relapsing-remitting (HCC)     Overweight       Past Surgical History:   Procedure Laterality Date    HX CORONARY STENT PLACEMENT  2003    HX HEMORRHOIDECTOMY  2005    HX TUBAL LIGATION  1987        Family History   Problem Relation Age of Onset    Cancer Mother         Cervical, Lung and Breast    Breast Cancer Mother     Heart Disease Father         Social History     Socioeconomic History    Marital status:      Spouse name: Not on file    Number of children: Not on file    Years of education: Not on file    Highest education level: Not on file   Occupational History    Not on file   Tobacco Use    Smoking status: Former     Types: Cigarettes    Smokeless tobacco: Never   Vaping Use    Vaping Use: Every day    Substances: Nicotine    Devices: Pre-filled or refillable cartridge   Substance and Sexual Activity    Alcohol use: Not Currently    Drug use: Never    Sexual activity: Not on file   Other Topics Concern    Not on file   Social History Narrative    Not on file     Social Determinants of Health     Financial Resource Strain: Not on file   Food Insecurity: Not on file   Transportation Needs: Not on file   Physical Activity: Insufficiently Active    Days of Exercise per Week: 3 days    Minutes of Exercise per Session: 30 min   Stress: Not on file   Social Connections: Not on file   Intimate Partner Violence: Not At Risk    Fear of Current or Ex-Partner: No    Emotionally Abused: No    Physically Abused: No    Sexually Abused: No   Housing Stability: Not on file           Visit Vitals  BP 99/66 (BP 1 Location: Left upper arm, BP Patient Position: Sitting)   Pulse 80   Temp 98.3 °F (36.8 °C) (Oral)   Resp 12   Ht 5' 4\" (1.626 m)   Wt 157 lb (71.2 kg)   SpO2 96%   BMI 26.95 kg/m²       Review of Systems   Constitutional:  Negative for chills, fever, malaise/fatigue and weight loss. HENT:  Negative for ear pain, hearing loss and sore throat. Respiratory:  Negative for cough and shortness of breath. Cardiovascular:  Negative for chest pain and leg swelling. Gastrointestinal:  Negative for abdominal pain, blood in stool, constipation, diarrhea, heartburn, melena, nausea and vomiting. Genitourinary:  Negative for dysuria and hematuria. Musculoskeletal:  Negative for back pain and myalgias. Skin:  Negative for rash. Neurological:  Negative for weakness and headaches. Psychiatric/Behavioral:  Negative for depression. Physical Exam  Vitals and nursing note reviewed. Constitutional:       Appearance: Normal appearance. She is normal weight. HENT:      Head: Normocephalic and atraumatic. Right Ear: Tympanic membrane, ear canal and external ear normal.      Left Ear: Tympanic membrane, ear canal and external ear normal.      Nose: Nose normal.      Mouth/Throat:      Mouth: Mucous membranes are moist.      Pharynx: Oropharynx is clear. Eyes:      Conjunctiva/sclera: Conjunctivae normal.      Pupils: Pupils are equal, round, and reactive to light. Neck:      Vascular: No carotid bruit. Cardiovascular:      Rate and Rhythm: Normal rate and regular rhythm. Pulses: Normal pulses. Heart sounds: Normal heart sounds. Pulmonary:      Effort: Pulmonary effort is normal.      Breath sounds: Normal breath sounds. No wheezing, rhonchi or rales.    Abdominal:      General: Abdomen is flat. Bowel sounds are normal. There is no distension. Palpations: There is no mass. Tenderness: There is no abdominal tenderness. There is no guarding or rebound. Musculoskeletal:         General: Normal range of motion. Cervical back: Normal range of motion and neck supple. No rigidity. Right lower leg: No edema. Left lower leg: No edema. Skin:     General: Skin is warm and dry. Capillary Refill: Capillary refill takes less than 2 seconds. Neurological:      General: No focal deficit present. Mental Status: She is alert and oriented to person, place, and time. Sensory: No sensory deficit. Gait: Gait normal.   Psychiatric:         Mood and Affect: Mood normal.         Behavior: Behavior normal.         Thought Content: Thought content normal.        No results found for this or any previous visit (from the past 24 hour(s)). ASSESSMENT AND PLAN  Diagnoses and all orders for this visit:    1. Medicare annual wellness visit, subsequent  Completed today. Still needs documented ACP, but has primary decision maker  2. Atherosclerosis of native coronary artery of native heart without angina pectoris  Continue lipitor, ASA  3. Multiple sclerosis, relapsing-remitting (HCC)  Continue Aubagio  4. Mild intermittent asthma without complication  Continue ASMANEX  5. Moderate episode of recurrent major depressive disorder ([facility]  Much improved with new diagnosis of bipolar 2, now being treated by outpatient psych. Addition of hydroxyzine for sleep and Lamictal for mood control  6. Hyperlipidemia, unspecified hyperlipidemia type   Recheck labs in spring before next visit      I have discussed the diagnosis with the patient and the intended plan as seen in the above orders. Patient is in agreement. The patient has received an after-visit summary and questions were answered concerning future plans.   I have discussed medication side effects and warnings with the patient as robbin.    Gina Washington PA-C

## 2022-09-05 ENCOUNTER — PATIENT MESSAGE (OUTPATIENT)
Dept: INTERNAL MEDICINE CLINIC | Age: 67
End: 2022-09-05

## 2022-09-05 DIAGNOSIS — I25.10 ATHEROSCLEROSIS OF NATIVE CORONARY ARTERY OF NATIVE HEART WITHOUT ANGINA PECTORIS: ICD-10-CM

## 2022-09-06 RX ORDER — ATORVASTATIN CALCIUM 20 MG/1
20 TABLET, FILM COATED ORAL DAILY
Qty: 90 TABLET | Refills: 3 | Status: SHIPPED | OUTPATIENT
Start: 2022-09-06

## 2022-09-06 NOTE — TELEPHONE ENCOUNTER
From: Lynn Pete  To:  [facility]  Sent: 2022 1:17 PM EDT  Subject: New prescription     My prescription for atorvastatin has  so pharmacy requires a new one  ATORVASTATIN 20mg  [address] [facility]   1955    Thank you

## 2022-09-06 NOTE — TELEPHONE ENCOUNTER
PCP: Adriana Diaz PA-C     Last appt: 8/22/2022     Future Appointments   Date Time Provider [place] Meeks   2/27/2023  8:00 AM Adriana Diaz PA-C [facility]          Requested Prescriptions     Pending Prescriptions Disp Refills    atorvastatin (LIPITOR) 20 mg tablet 90 Tablet 3     Sig: Take 1 Tablet by mouth daily.

## 2022-10-05 ENCOUNTER — HOSPITAL ENCOUNTER (EMERGENCY)
Age: 67
Discharge: HOME | End: 2022-10-05
Attending: EMERGENCY MEDICINE
Payer: MEDICARE

## 2022-10-05 ENCOUNTER — APPOINTMENT (OUTPATIENT)
Dept: GENERAL RADIOLOGY | Age: 67
End: 2022-10-05
Attending: EMERGENCY MEDICINE
Payer: MEDICARE

## 2022-10-05 VITALS
DIASTOLIC BLOOD PRESSURE: 77 MMHG | WEIGHT: 160.27 LBS | SYSTOLIC BLOOD PRESSURE: 105 MMHG | HEIGHT: 64 IN | RESPIRATION RATE: 16 BRPM | BODY MASS INDEX: 27.36 KG/M2 | OXYGEN SATURATION: 95 % | HEART RATE: 66 BPM | TEMPERATURE: 98.3 F

## 2022-10-05 DIAGNOSIS — R07.9 ACUTE CHEST PAIN: Primary | ICD-10-CM

## 2022-10-05 LAB
ALBUMIN SERPL-MCNC: 3.9 G/DL (ref 3.5–5)
ALBUMIN/GLOB SERPL: 1.3 {RATIO} (ref 1.1–2.2)
ALP SERPL-CCNC: 122 U/L (ref 45–117)
ALT SERPL-CCNC: 35 U/L (ref 12–78)
ANION GAP SERPL CALC-SCNC: 3 MMOL/L (ref 5–15)
AST SERPL-CCNC: 21 U/L (ref 15–37)
ATRIAL RATE: 72 BPM
BASOPHILS # BLD: 0.1 K/UL (ref 0–0.1)
BASOPHILS NFR BLD: 1 % (ref 0–1)
BILIRUB SERPL-MCNC: 0.5 MG/DL (ref 0.2–1)
BNP SERPL-MCNC: 171 PG/ML
BUN SERPL-MCNC: 8 MG/DL (ref 6–20)
BUN/CREAT SERPL: 13 (ref 12–20)
CALCIUM SERPL-MCNC: 9.9 MG/DL (ref 8.5–10.1)
CALCULATED P AXIS, ECG09: 55 DEGREES
CALCULATED R AXIS, ECG10: 39 DEGREES
CALCULATED T AXIS, ECG11: 33 DEGREES
CHLORIDE SERPL-SCNC: 107 MMOL/L (ref 97–108)
CO2 SERPL-SCNC: 29 MMOL/L (ref 21–32)
CREAT SERPL-MCNC: 0.64 MG/DL (ref 0.55–1.02)
DIAGNOSIS, 93000: NORMAL
DIFFERENTIAL METHOD BLD: NORMAL
EOSINOPHIL # BLD: 0.3 K/UL (ref 0–0.4)
EOSINOPHIL NFR BLD: 4 % (ref 0–7)
ERYTHROCYTE [DISTWIDTH] IN BLOOD BY AUTOMATED COUNT: 12.1 % (ref 11.5–14.5)
GLOBULIN SER CALC-MCNC: 3 G/DL (ref 2–4)
GLUCOSE SERPL-MCNC: 91 MG/DL (ref 65–100)
HCT VFR BLD AUTO: 42.3 % (ref 35–47)
HGB BLD-MCNC: 14 G/DL (ref 11.5–16)
IMM GRANULOCYTES # BLD AUTO: 0 K/UL (ref 0–0.04)
IMM GRANULOCYTES NFR BLD AUTO: 0 % (ref 0–0.5)
LYMPHOCYTES # BLD: 2.2 K/UL (ref 0.8–3.5)
LYMPHOCYTES NFR BLD: 36 % (ref 12–49)
MCH RBC QN AUTO: 30.8 PG (ref 26–34)
MCHC RBC AUTO-ENTMCNC: 33.1 G/DL (ref 30–36.5)
MCV RBC AUTO: 93 FL (ref 80–99)
MONOCYTES # BLD: 0.7 K/UL (ref 0–1)
MONOCYTES NFR BLD: 11 % (ref 5–13)
NEUTS SEG # BLD: 3 K/UL (ref 1.8–8)
NEUTS SEG NFR BLD: 48 % (ref 32–75)
NRBC # BLD: 0 K/UL (ref 0–0.01)
NRBC BLD-RTO: 0 PER 100 WBC
P-R INTERVAL, ECG05: 204 MS
PLATELET # BLD AUTO: 209 K/UL (ref 150–400)
PMV BLD AUTO: 9.2 FL (ref 8.9–12.9)
POTASSIUM SERPL-SCNC: 4.1 MMOL/L (ref 3.5–5.1)
PROT SERPL-MCNC: 6.9 G/DL (ref 6.4–8.2)
Q-T INTERVAL, ECG07: 380 MS
QRS DURATION, ECG06: 94 MS
QTC CALCULATION (BEZET), ECG08: 416 MS
RBC # BLD AUTO: 4.55 M/UL (ref 3.8–5.2)
SODIUM SERPL-SCNC: 139 MMOL/L (ref 136–145)
TROPONIN-HIGH SENSITIVITY: 6 NG/L (ref 0–51)
TROPONIN-HIGH SENSITIVITY: 6 NG/L (ref 0–51)
VENTRICULAR RATE, ECG03: 72 BPM
WBC # BLD AUTO: 6.3 K/UL (ref 3.6–11)

## 2022-10-05 PROCEDURE — 74011250637 HC RX REV CODE- 250/637: Performed by: EMERGENCY MEDICINE

## 2022-10-05 PROCEDURE — 93005 ELECTROCARDIOGRAM TRACING: CPT

## 2022-10-05 PROCEDURE — 74011000250 HC RX REV CODE- 250: Performed by: EMERGENCY MEDICINE

## 2022-10-05 PROCEDURE — 36415 COLL VENOUS BLD VENIPUNCTURE: CPT

## 2022-10-05 PROCEDURE — 71045 X-RAY EXAM CHEST 1 VIEW: CPT

## 2022-10-05 PROCEDURE — 99285 EMERGENCY DEPT VISIT HI MDM: CPT

## 2022-10-05 PROCEDURE — 85025 COMPLETE CBC W/AUTO DIFF WBC: CPT

## 2022-10-05 PROCEDURE — 83880 ASSAY OF NATRIURETIC PEPTIDE: CPT

## 2022-10-05 PROCEDURE — 80053 COMPREHEN METABOLIC PANEL: CPT

## 2022-10-05 PROCEDURE — 84484 ASSAY OF TROPONIN QUANT: CPT

## 2022-10-05 RX ORDER — SUCRALFATE 1 G/10ML
1 SUSPENSION ORAL 4 TIMES DAILY
Qty: 560 ML | Refills: 0 | Status: SHIPPED | OUTPATIENT
Start: 2022-10-05 | End: 2022-10-19

## 2022-10-05 RX ORDER — FAMOTIDINE 20 MG/1
20 TABLET, FILM COATED ORAL
Status: COMPLETED | OUTPATIENT
Start: 2022-10-05 | End: 2022-10-05

## 2022-10-05 RX ORDER — OMEPRAZOLE 40 MG/1
40 CAPSULE, DELAYED RELEASE ORAL DAILY
Qty: 30 CAPSULE | Refills: 0 | Status: SHIPPED | OUTPATIENT
Start: 2022-10-05

## 2022-10-05 RX ADMIN — LIDOCAINE HYDROCHLORIDE 40 ML: 20 SOLUTION TOPICAL at 11:23

## 2022-10-05 RX ADMIN — FAMOTIDINE 20 MG: 20 TABLET, FILM COATED ORAL at 11:23

## 2022-10-05 NOTE — ED PROVIDER NOTES
EMERGENCY DEPARTMENT HISTORY AND PHYSICAL EXAM      Date: 10/5/2022  Patient Name: Bree Enciso  Patient Age and Sex: 73 y.o. female     History of Presenting Illness     Chief Complaint   Patient presents with    Chest Pain     Pt ambulatory into triage with a cc of chest pain x 1 day; pt has taken ASA before arrival; pt denies SOB       History Provided By: Patient    HPI: Bree Enciso is a 20-year-old History MS, CAD status post coronary stent placement in 2003 presenting with chest pain she reports since yesterday evening she has had a squeezing type chest pain in her left lower parasternal chest.  Is nonradiating, intermittent, feels like contractions. Pain does not radiate, is nonexertional.  She climbed up a flight of stairs with no onset of the pain. She denies any associated dyspnea nausea diaphoresis leg swelling palpitations. No obvious alleviating or exacerbating factors. She does report some reflux a few days ago though believes this pain is unrelated. She is unable to identify any  lifting or twisting injury to cause this pain. Took aspirin prior to arrival.    There are no other complaints, changes, or physical findings at this time. PCP: Christophe Kessler PA-C    No current facility-administered medications on file prior to encounter. Current Outpatient Medications on File Prior to Encounter   Medication Sig Dispense Refill    atorvastatin (LIPITOR) 20 mg tablet Take 1 Tablet by mouth daily.  90 Tablet 3    ALPRAZolam (XANAX) 0.5 mg tablet TAKE 1/2 TO 1 (ONE-HALF TO ONE) TABLET BY MOUTH AS NEEDED PRIOR TO FLYING ON AIRPLANE      citalopram (CELEXA) 20 mg tablet TAKE 1 & 1/2 (ONE & ONE-HALF) TABLETS BY MOUTH ONCE DAILY      hydrOXYzine HCL (ATARAX) 10 mg tablet TAKE 1 TO 2 TABLETS BY MOUTH EVERY DAY AT BEDTIME AS NEEDED FOR INSOMNIA      lamoTRIgine (LaMICtal) 100 mg tablet TAKE 1 TABLET BY MOUTH EVERY DAY AT BEDTIME      fluticasone propionate (FLONASE) 50 mcg/actuation nasal spray Use 2 spray(s) in each nostril once daily 16 g 0    loratadine (CLARITIN) 10 mg tablet Take 10 mg by mouth. baclofen (LIORESAL) 10 mg tablet Take 10 mg by mouth two (2) times a day. aspirin delayed-release 81 mg tablet Take 81 mg by mouth daily. multivitamin with iron tablet Take 1 Tablet by mouth daily. buPROPion XL (WELLBUTRIN XL) 300 mg XL tablet Take 1 Tablet by mouth Every morning. 90 Tablet 3    Calcium-Cholecalciferol, D3, (Calcium 600 with Vitamin D3) 600 mg(1,500mg) -400 unit chew Take 1 Tab by mouth daily. 90 Tab 3    Asmanex  mcg/actuation HFAA inhaler INHALE 1 PUFF BY MOUTH TWICE DAILY      teriflunomide (AUBAGIO) 14 mg tablet Take 1 Tab by mouth daily. Past History     Past Medical History:  Past Medical History:   Diagnosis Date    Asthma in adult, mild intermittent, uncomplicated     Depression     GERD (gastroesophageal reflux disease)     Hyperlipemia     IBS (irritable bowel syndrome)     Major depressive disorder, recurrent episode, in partial remission (HCC)     Migraine headache     Multiple sclerosis, relapsing-remitting (HCC)     Overweight      Past Surgical History:  Past Surgical History:   Procedure Laterality Date    HX CORONARY STENT PLACEMENT  2003    HX HEMORRHOIDECTOMY  2005    HX TUBAL LIGATION  1987       Family History:  Family History   Problem Relation Age of Onset    Cancer Mother         Cervical, Lung and Breast    Breast Cancer Mother     Heart Disease Father        Social History:  Social History     Tobacco Use    Smoking status: Former     Types: Cigarettes    Smokeless tobacco: Never   Vaping Use    Vaping Use: Every day    Substances: Nicotine    Devices: Pre-filled or refillable cartridge   Substance Use Topics    Alcohol use: Not Currently    Drug use: Never       Allergies:   Allergies   Allergen Reactions    Opioids-Methadone And Related Other (comments)     Highly sensitive         Review of Systems   Review of Systems   Constitutional: Negative for chills and fever. HENT:  Negative for congestion and rhinorrhea. Respiratory:  Negative for shortness of breath. Cardiovascular:  Positive for chest pain. Gastrointestinal:  Negative for abdominal pain, nausea and vomiting. Genitourinary:  Negative for dysuria and frequency. Musculoskeletal:  Negative for myalgias. All other systems reviewed and are negative. Physical Exam   Physical Exam  Vitals and nursing note reviewed. Constitutional:       General: She is not in acute distress. Appearance: Normal appearance. She is not ill-appearing. HENT:      Head: Normocephalic. Mouth/Throat:      Mouth: Mucous membranes are moist.   Eyes:      Conjunctiva/sclera: Conjunctivae normal.   Cardiovascular:      Rate and Rhythm: Normal rate and regular rhythm. Pulses: Normal pulses. Radial pulses are 2+ on the right side and 2+ on the left side. Heart sounds: Normal heart sounds. No murmur heard. Pulmonary:      Effort: Pulmonary effort is normal.      Breath sounds: Normal breath sounds. Abdominal:      General: Abdomen is flat. Palpations: Abdomen is soft. Musculoskeletal:         General: No deformity. Right lower leg: No edema. Left lower leg: No edema. Comments: Left lower parasternal tenderness palpation   Skin:     General: Skin is warm and dry. Neurological:      Mental Status: She is alert. Mental status is at baseline. Comments: Baseline weakness, baseline left foot drop, oriented x3   Psychiatric:         Behavior: Behavior normal.         Thought Content:  Thought content normal.       Diagnostic Study Results     Labs -     Recent Results (from the past 12 hour(s))   CBC WITH AUTOMATED DIFF    Collection Time: 10/05/22  9:54 AM   Result Value Ref Range    WBC 6.3 3.6 - 11.0 K/uL    RBC 4.55 3.80 - 5.20 M/uL    HGB 14.0 11.5 - 16.0 g/dL    HCT 42.3 35.0 - 47.0 %    MCV 93.0 80.0 - 99.0 FL    MCH 30.8 26.0 - 34.0 PG    MCHC 33.1 30.0 - 36.5 g/dL    RDW 12.1 11.5 - 14.5 %    PLATELET 256 883 - 417 K/uL    MPV 9.2 8.9 - 12.9 FL    NRBC 0.0 0  WBC    ABSOLUTE NRBC 0.00 0.00 - 0.01 K/uL    NEUTROPHILS 48 32 - 75 %    LYMPHOCYTES 36 12 - 49 %    MONOCYTES 11 5 - 13 %    EOSINOPHILS 4 0 - 7 %    BASOPHILS 1 0 - 1 %    IMMATURE GRANULOCYTES 0 0.0 - 0.5 %    ABS. NEUTROPHILS 3.0 1.8 - 8.0 K/UL    ABS. LYMPHOCYTES 2.2 0.8 - 3.5 K/UL    ABS. MONOCYTES 0.7 0.0 - 1.0 K/UL    ABS. EOSINOPHILS 0.3 0.0 - 0.4 K/UL    ABS. BASOPHILS 0.1 0.0 - 0.1 K/UL    ABS. IMM. GRANS. 0.0 0.00 - 0.04 K/UL    DF AUTOMATED     METABOLIC PANEL, COMPREHENSIVE    Collection Time: 10/05/22  9:54 AM   Result Value Ref Range    Sodium 139 136 - 145 mmol/L    Potassium 4.1 3.5 - 5.1 mmol/L    Chloride 107 97 - 108 mmol/L    CO2 29 21 - 32 mmol/L    Anion gap 3 (L) 5 - 15 mmol/L    Glucose 91 65 - 100 mg/dL    BUN 8 6 - 20 MG/DL    Creatinine 0.64 0.55 - 1.02 MG/DL    BUN/Creatinine ratio 13 12 - 20      eGFR >60 >60 ml/min/1.73m2    Calcium 9.9 8.5 - 10.1 MG/DL    Bilirubin, total 0.5 0.2 - 1.0 MG/DL    ALT (SGPT) 35 12 - 78 U/L    AST (SGOT) 21 15 - 37 U/L    Alk.  phosphatase 122 (H) 45 - 117 U/L    Protein, total 6.9 6.4 - 8.2 g/dL    Albumin 3.9 3.5 - 5.0 g/dL    Globulin 3.0 2.0 - 4.0 g/dL    A-G Ratio 1.3 1.1 - 2.2     NT-PRO BNP    Collection Time: 10/05/22  9:54 AM   Result Value Ref Range    NT pro- (H) <125 PG/ML   TROPONIN-HIGH SENSITIVITY    Collection Time: 10/05/22  9:54 AM   Result Value Ref Range    Troponin-High Sensitivity 6 0 - 51 ng/L   EKG, 12 LEAD, INITIAL    Collection Time: 10/05/22  9:59 AM   Result Value Ref Range    Ventricular Rate 72 BPM    Atrial Rate 72 BPM    P-R Interval 204 ms    QRS Duration 94 ms    Q-T Interval 380 ms    QTC Calculation (Bezet) 416 ms    Calculated P Axis 55 degrees    Calculated R Axis 39 degrees    Calculated T Axis 33 degrees    Diagnosis       Normal sinus rhythm  Possible Left atrial enlargement  No previous ECGs available  Confirmed by Christine Anna ([id number]) on 10/5/2022 3:46:34 PM     TROPONIN-HIGH SENSITIVITY    Collection Time: 10/05/22 11:51 AM   Result Value Ref Range    Troponin-High Sensitivity 6 0 - 51 ng/L       Radiologic Studies -   XR CHEST PORT   Final Result   No acute process. CT Results  (Last 48 hours)      None          CXR Results  (Last 48 hours)                 10/05/22 1110  XR CHEST PORT Final result    Impression:  No acute process. Narrative:  INDICATION: Chest Pain       EXAM:  AP CHEST RADIOGRAPH       COMPARISON: None       FINDINGS:       AP portable view of the chest demonstrates a normal cardiomediastinal   silhouette. There is no edema, effusion, consolidation, or pneumothorax. The   osseous structures are unremarkable. Medical Decision Making   I am the first provider for this patient. I reviewed the vital signs, available nursing notes, past medical history, past surgical history, family history and social history. Vital Signs-Reviewed the patient's vital signs. Patient Vitals for the past 12 hrs:   Temp Pulse Resp BP SpO2   10/05/22 1115 -- 66 16 105/77 95 %   10/05/22 1059 -- 68 13 113/73 95 %   10/05/22 1044 -- 71 17 125/85 94 %   10/05/22 1029 -- 67 16 131/78 96 %   10/05/22 1015 -- 72 16 131/86 96 %   10/05/22 0948 98.3 °F (36.8 °C) 81 18 (!) 129/90 96 %       Records Reviewed: Nursing Notes and Old Medical Records    Provider Notes (Medical Decision Making):   DDx ACS costochondritis, reflux no known she is well-appearing intact pulses, low suspicion by history of ACS, consider musculoskeletal chest pain    Will obtain EKG troponin CBC CMP chest x-ray. Given recent reflux symptoms will trial Maalox, Pepcid. ED Course:   Initial assessment performed. The patients presenting problems have been discussed, and they are in agreement with the care plan formulated and outlined with them.   I have encouraged them to ask questions as they arise throughout their visit. ED Course as of 10/05/22 1707   Wed Oct 05, 2022   1139 Troponin is 6 we will trend [WB]   1253 Repeat troponin 6 [WB]   1254 EKG shows normal sinus rhythm at a rate of 72 normal axis normal intervals no STEMI no peak T waves nonischemic appearing [WB]      ED Course User Index  [WB] Tom Greer MD     Disposition:  Discharge Note:  The patient has been re-evaluated and is ready for discharge. Reviewed available results with patient. Counseled patient on diagnosis and care plan. Patient has expressed understanding, and all questions have been answered. Patient agrees with plan and agrees to follow up as recommended, or to return to the ED if their symptoms worsen. Discharge instructions have been provided and explained to the patient, along with reasons to return to the ED. PLAN:  Discharge Medication List as of 10/5/2022  1:28 PM        START taking these medications    Details   omeprazole (PRILOSEC) 40 mg capsule Take 1 Capsule by mouth daily. , Normal, Disp-30 Capsule, R-0      sucralfate (CARAFATE) 100 mg/mL suspension Take 10 mL by mouth four (4) times daily for 14 days. , Normal, Disp-560 mL, R-0           CONTINUE these medications which have NOT CHANGED    Details   atorvastatin (LIPITOR) 20 mg tablet Take 1 Tablet by mouth daily. , Normal, Disp-90 Tablet, R-3      ALPRAZolam (XANAX) 0.5 mg tablet TAKE 1/2 TO 1 (ONE-HALF TO ONE) TABLET BY MOUTH AS NEEDED PRIOR TO FLYING ON AIRPLANE, Historical Med      citalopram (CELEXA) 20 mg tablet TAKE 1 & 1/2 (ONE & ONE-HALF) TABLETS BY MOUTH ONCE DAILY, Historical Med      hydrOXYzine HCL (ATARAX) 10 mg tablet TAKE 1 TO 2 TABLETS BY MOUTH EVERY DAY AT BEDTIME AS NEEDED FOR INSOMNIA, Historical Med      lamoTRIgine (LaMICtal) 100 mg tablet TAKE 1 TABLET BY MOUTH EVERY DAY AT BEDTIME, Historical Med      fluticasone propionate (FLONASE) 50 mcg/actuation nasal spray Use 2 spray(s) in each nostril once daily, Normal, Disp-16 g, R-0      loratadine (CLARITIN) 10 mg tablet Take 10 mg by mouth., Historical Med      baclofen (LIORESAL) 10 mg tablet Take 10 mg by mouth two (2) times a day., Historical Med      aspirin delayed-release 81 mg tablet Take 81 mg by mouth daily. , Historical Med      multivitamin with iron tablet Take 1 Tablet by mouth daily. , Historical Med      buPROPion XL (WELLBUTRIN XL) 300 mg XL tablet Take 1 Tablet by mouth Every morning., Normal, Disp-90 Tablet, R-3      Calcium-Cholecalciferol, D3, (Calcium 600 with Vitamin D3) 600 mg(1,500mg) -400 unit chew Take 1 Tab by mouth daily. , Normal, Disp-90 Tab, R-3      Asmanex  mcg/actuation HFAA inhaler INHALE 1 PUFF BY MOUTH TWICE DAILY, Historical Med, MARISA      teriflunomide (AUBAGIO) 14 mg tablet Take 1 Tab by mouth daily. , Historical Med           2. Follow-up Information       Follow up With Specialties Details Why Contact Info    Perlita Neil MD Cardiovascular Disease Physician, Internal Medicine Physician   [address]  [address]  [facility]  [phone]      Melvin Ruiz, [address] [facility] Vascular Surgery, Cardiovascular Disease Physician, Interventional Cardiology Physician   [address] [address]  [phone]      [facility] EMERGENCY DEPT Emergency Medicine  If symptoms worsen [address]  [address] [facility]  [phone]          3. Return to ED if worse     Diagnosis     Clinical Impression:   1. Acute chest pain        Attestations:    Mirna Alatorre M.D. Please note that this dictation was completed with [employer] the [employer] voice recognition software. Quite often unanticipated grammatical, syntax, homophones, and other interpretive errors are inadvertently transcribed by the computer software. Please disregard these errors. Please excuse any errors that have escaped final proofreading. Thank you.

## 2022-10-05 NOTE — ED NOTES
Sandra BAKER reviewed discharge instructions with the patient and removed IV. The patient verbalized understanding. All questions and concerns were addressed. The patient declined a wheelchair and is discharged ambulatory in the care of family members with instructions and prescriptions in hand. Pt is alert and oriented x 4. Respirations are clear and unlabored.

## 2022-10-06 RX ORDER — FLUTICASONE PROPIONATE 50 MCG
SPRAY, SUSPENSION (ML) NASAL
Qty: 16 G | Refills: 0 | Status: SHIPPED | OUTPATIENT
Start: 2022-10-06

## 2023-03-15 ENCOUNTER — OFFICE VISIT (OUTPATIENT)
Dept: INTERNAL MEDICINE CLINIC | Age: 68
End: 2023-03-15

## 2023-03-15 VITALS
OXYGEN SATURATION: 95 % | WEIGHT: 166.2 LBS | BODY MASS INDEX: 28.38 KG/M2 | HEART RATE: 74 BPM | HEIGHT: 64 IN | DIASTOLIC BLOOD PRESSURE: 69 MMHG | SYSTOLIC BLOOD PRESSURE: 125 MMHG | RESPIRATION RATE: 16 BRPM | TEMPERATURE: 98.1 F

## 2023-03-15 DIAGNOSIS — F41.9 ANXIETY: ICD-10-CM

## 2023-03-15 DIAGNOSIS — F31.81 BIPOLAR 2 DISORDER (HCC): ICD-10-CM

## 2023-03-15 DIAGNOSIS — R92.2 DENSE BREAST TISSUE: ICD-10-CM

## 2023-03-15 DIAGNOSIS — E78.5 HYPERLIPIDEMIA, UNSPECIFIED HYPERLIPIDEMIA TYPE: ICD-10-CM

## 2023-03-15 DIAGNOSIS — J45.20 MILD INTERMITTENT ASTHMA WITHOUT COMPLICATION: ICD-10-CM

## 2023-03-15 DIAGNOSIS — I25.119 ATHEROSCLEROSIS OF NATIVE CORONARY ARTERY OF NATIVE HEART WITH ANGINA PECTORIS (HCC): ICD-10-CM

## 2023-03-15 DIAGNOSIS — G35 MULTIPLE SCLEROSIS, RELAPSING-REMITTING (HCC): Primary | ICD-10-CM

## 2023-03-15 DIAGNOSIS — F33.41 RECURRENT MAJOR DEPRESSIVE DISORDER, IN PARTIAL REMISSION (HCC): ICD-10-CM

## 2023-03-15 DIAGNOSIS — Z80.3 FAMILY HISTORY OF BREAST CANCER: ICD-10-CM

## 2023-03-15 DIAGNOSIS — Z12.31 BREAST CANCER SCREENING BY MAMMOGRAM: ICD-10-CM

## 2023-03-15 RX ORDER — ALBUTEROL SULFATE 90 UG/1
1 AEROSOL, METERED RESPIRATORY (INHALATION)
Qty: 18 G | Refills: 1 | Status: SHIPPED | OUTPATIENT
Start: 2023-03-15

## 2023-03-15 NOTE — PROGRESS NOTES
Sravani Mcrae  Identified pt with two pt identifiers(name and ). Chief Complaint   Patient presents with    Mental Health Problem     Room 4B //        Reviewed record In preparation for visit and have obtained necessary documentation. 1. Have you been to the ER, urgent care clinic or hospitalized since your last visit? No     2. Have you seen or consulted any other health care providers outside of the [address] [place] [address] since your last visit? Include any pap smears or colon screening. No    Patient has an advance directive. Vitals reviewed with provider.     Health Maintenance reviewed:     Health Maintenance Due   Topic    Breast Cancer Screen Mammogram           Wt Readings from Last 3 Encounters:   03/15/23 166 lb 3.2 oz (75.4 kg)   10/05/22 160 lb 4.4 oz (72.7 kg)   22 157 lb (71.2 kg)        Temp Readings from Last 3 Encounters:   03/15/23 98.1 °F (36.7 °C) (Oral)   10/05/22 98.3 °F (36.8 °C)   22 98.3 °F (36.8 °C) (Oral)        BP Readings from Last 3 Encounters:   03/15/23 125/69   10/05/22 105/77   22 99/66        Pulse Readings from Last 3 Encounters:   03/15/23 74   10/05/22 66   22 80        Vitals:    03/15/23 0826   BP: 125/69   Pulse: 74   Resp: 16   Temp: 98.1 °F (36.7 °C)   TempSrc: Oral   SpO2: 95%   Weight: 166 lb 3.2 oz (75.4 kg)   Height: 5' 4\" (1.626 m)   PainSc:   0 - No pain          Learning Assessment:   :       Learning Assessment 3/4/2021   PRIMARY LEARNER Patient   HIGHEST LEVEL OF EDUCATION - PRIMARY LEARNER  2 YEARS OF COLLEGE   BARRIERS PRIMARY LEARNER NONE   PRIMARY LANGUAGE ENGLISH   LEARNER PREFERENCE PRIMARY READING   ANSWERED BY Patient   RELATIONSHIP SELF        Depression Screening:   :       3 most recent PHQ Screens 2022   PHQ Not Done -   Little interest or pleasure in doing things Not at all   Feeling down, depressed, irritable, or hopeless Not at all   Total Score PHQ 2 0   Trouble falling or staying asleep, or sleeping too much -   Feeling tired or having little energy -   Poor appetite, weight loss, or overeating -   Feeling bad about yourself - or that you are a failure or have let yourself or your family down -   Trouble concentrating on things such as school, work, reading, or watching TV -   Moving or speaking so slowly that other people could have noticed; or the opposite being so fidgety that others notice -   Thoughts of being better off dead, or hurting yourself in some way -   PHQ 9 Score -        Fall Risk Assessment:   :       Fall Risk Assessment, last 12 mths 8/22/2022   Able to walk? Yes   Fall in past 12 months? 0   Do you feel unsteady? 1   Are you worried about falling 1        Abuse Screening:   :       Abuse Screening Questionnaire 8/22/2022 6/7/2022 3/4/2021   Do you ever feel afraid of your partner? N N N   Are you in a relationship with someone who physically or mentally threatens you? N N N   Is it safe for you to go home?  Y Y Y        ADL Screening:   :       ADL Assessment 8/22/2022   Feeding yourself No Help Needed   Getting from bed to chair No Help Needed   Getting dressed No Help Needed   Bathing or showering No Help Needed   Walk across the room (includes cane/walker) No Help Needed   Using the telphone No Help Needed   Taking your medications No Help Needed   Preparing meals No Help Needed   Managing money (expenses/bills) No Help Needed   Moderately strenuous housework (laundry) No Help Needed   Shopping for personal items (toiletries/medicines) No Help Needed   Shopping for groceries No Help Needed   Driving No Help Needed   Climbing a flight of stairs No Help Needed   Getting to places beyond walking distances No Help Needed

## 2023-03-15 NOTE — PROGRESS NOTES
Zohaib Tarango is a 78y.o. year old female seen in clinic today for   Chief Complaint   Patient presents with    Mental Health Problem     Room 4B //        she is here today to follow up for depression/bipolar2, MS, HLD    Mood Disorder: currently very well controlled. Now seeing mental health NP at [facility] and is transitioning behavioral health care to [facility]. Currently using lamictal 100 mg at bedtime, wellbutrin 300 mg xl, celexa 20 mg, hydroxyzine at night. Doing very well, admits she has not felt this good in several years. Has more energy, feels her MS is better controlled and is planning more activities including trip to [place] in a few weeks which she would not have attempted in the past 2-3 years. MS: Still followed by Neuro. No changes to aubagio dose or treatment. Patient has has hx of HLD. Takes 20 mg lipitor. No RUQ pain, no jaundice, no muscle aches. Has not needed inhaler for asthma in quite a while. Having normal allergies in recent weeks but no SOB, wheezing. Needs new inhaler. she specifically denies any CP, SOB, HA. Dizziness, fevers, chills, N/V/D, urinary symptoms or other bowel changes. Current Outpatient Medications on File Prior to Visit   Medication Sig Dispense Refill    fluticasone propionate (FLONASE) 50 mcg/actuation nasal spray Use 2 spray(s) in each nostril once daily 16 g 2    omeprazole (PRILOSEC) 40 mg capsule Take 1 Capsule by mouth daily. 30 Capsule 0    atorvastatin (LIPITOR) 20 mg tablet Take 1 Tablet by mouth daily.  90 Tablet 3    ALPRAZolam (XANAX) 0.5 mg tablet TAKE 1/2 TO 1 (ONE-HALF TO ONE) TABLET BY MOUTH AS NEEDED PRIOR TO FLYING ON AIRPLANE      citalopram (CELEXA) 20 mg tablet TAKE 1 & 1/2 (ONE & ONE-HALF) TABLETS BY MOUTH ONCE DAILY      hydrOXYzine HCL (ATARAX) 10 mg tablet TAKE 1 TO 2 TABLETS BY MOUTH EVERY DAY AT BEDTIME AS NEEDED FOR INSOMNIA      lamoTRIgine (LaMICtal) 100 mg tablet TAKE 1 TABLET BY MOUTH EVERY DAY AT BEDTIME loratadine (CLARITIN) 10 mg tablet Take 10 mg by mouth. baclofen (LIORESAL) 10 mg tablet Take 10 mg by mouth two (2) times a day. aspirin delayed-release 81 mg tablet Take 81 mg by mouth daily. multivitamin with iron tablet Take 1 Tablet by mouth daily. buPROPion XL (WELLBUTRIN XL) 300 mg XL tablet Take 1 Tablet by mouth Every morning. 90 Tablet 3    Calcium-Cholecalciferol, D3, (Calcium 600 with Vitamin D3) 600 mg(1,500mg) -400 unit chew Take 1 Tab by mouth daily. 90 Tab 3    Asmanex  mcg/actuation HFAA inhaler INHALE 1 PUFF BY MOUTH TWICE DAILY      teriflunomide (AUBAGIO) 14 mg tablet Take 1 Tab by mouth daily. No current facility-administered medications on file prior to visit.          Allergies   Allergen Reactions    Opioids-Methadone And Related Other (comments)     Highly sensitive     Past Medical History:   Diagnosis Date    Asthma in adult, mild intermittent, uncomplicated     Depression     GERD (gastroesophageal reflux disease)     Hyperlipemia     IBS (irritable bowel syndrome)     Major depressive disorder, recurrent episode, in partial remission (HCC)     Migraine headache     Multiple sclerosis, relapsing-remitting (HCC)     Overweight       Past Surgical History:   Procedure Laterality Date    HX CORONARY STENT PLACEMENT  2003    HX HEMORRHOIDECTOMY  2005    HX TUBAL LIGATION  1987        Family History   Problem Relation Age of Onset    Cancer Mother         Cervical, Lung and Breast    Breast Cancer Mother     Heart Disease Father         Social History     Socioeconomic History    Marital status:      Spouse name: Not on file    Number of children: Not on file    Years of education: Not on file    Highest education level: Not on file   Occupational History    Not on file   Tobacco Use    Smoking status: Former     Types: Cigarettes    Smokeless tobacco: Never   Vaping Use    Vaping Use: Every day    Substances: Nicotine    Devices: Pre-filled or refillable cartridge   Substance and Sexual Activity    Alcohol use: Not Currently    Drug use: Never    Sexual activity: Not on file   Other Topics Concern    Not on file   Social History Narrative    Not on file     Social Determinants of Health     Financial Resource Strain: Not on file   Food Insecurity: Not on file   Transportation Needs: Not on file   Physical Activity: Insufficiently Active    Days of Exercise per Week: 3 days    Minutes of Exercise per Session: 30 min   Stress: Not on file   Social Connections: Not on file   Intimate Partner Violence: Not At Risk    Fear of Current or Ex-Partner: No    Emotionally Abused: No    Physically Abused: No    Sexually Abused: No   Housing Stability: Not on file           Visit Vitals  /69 (BP 1 Location: Left upper arm, BP Patient Position: Sitting, BP Cuff Size: Adult)   Pulse 74   Temp 98.1 °F (36.7 °C) (Oral)   Resp 16   Ht 5' 4\" (1.626 m)   Wt 166 lb 3.2 oz (75.4 kg)   SpO2 95%   BMI 28.53 kg/m²       Review of Systems   Constitutional:  Negative for chills, fever, malaise/fatigue and weight loss. HENT:  Negative for ear pain, hearing loss and sore throat. Respiratory:  Negative for cough and shortness of breath. Cardiovascular:  Negative for chest pain and leg swelling. Gastrointestinal:  Negative for abdominal pain, blood in stool, constipation, diarrhea, heartburn, melena, nausea and vomiting. Genitourinary:  Negative for dysuria and hematuria. Musculoskeletal:  Negative for back pain and myalgias. Skin:  Negative for rash. Neurological:  Negative for weakness and headaches. Psychiatric/Behavioral:  Negative for depression. Physical Exam  Vitals and nursing note reviewed. Constitutional:       Appearance: Normal appearance. She is normal weight. HENT:      Head: Normocephalic and atraumatic.       Right Ear: Tympanic membrane, ear canal and external ear normal.      Left Ear: Tympanic membrane, ear canal and external ear normal. Nose: Nose normal.      Mouth/Throat:      Mouth: Mucous membranes are moist.      Pharynx: Oropharynx is clear. Eyes:      Conjunctiva/sclera: Conjunctivae normal.      Pupils: Pupils are equal, round, and reactive to light. Neck:      Vascular: No carotid bruit. Cardiovascular:      Rate and Rhythm: Normal rate and regular rhythm. Pulses: Normal pulses. Heart sounds: Normal heart sounds. Pulmonary:      Effort: Pulmonary effort is normal.      Breath sounds: Normal breath sounds. No wheezing, rhonchi or rales. Abdominal:      General: Abdomen is flat. Bowel sounds are normal. There is no distension. Palpations: There is no mass. Tenderness: There is no abdominal tenderness. There is no guarding or rebound. Musculoskeletal:         General: Normal range of motion. Cervical back: Normal range of motion and neck supple. No rigidity. Right lower leg: No edema. Left lower leg: No edema. Skin:     General: Skin is warm and dry. Capillary Refill: Capillary refill takes less than 2 seconds. Neurological:      General: No focal deficit present. Mental Status: She is alert and oriented to person, place, and time. Sensory: No sensory deficit. Gait: Gait normal.   Psychiatric:         Mood and Affect: Mood normal.         Behavior: Behavior normal.         Thought Content: Thought content normal.        No results found for this or any previous visit (from the past 24 hour(s)). ASSESSMENT AND PLAN  Diagnoses and all orders for this visit:    1. Multiple sclerosis, relapsing-remitting ([facility]    2. Bipolar 2 disorder ([facility]    3. Atherosclerosis of native coronary artery of native heart with angina pectoris ([address]    4. Mild intermittent asthma without complication  -     albuterol (PROVENTIL HFA, VENTOLIN HFA, PROAIR HFA) 90 mcg/actuation inhaler; Take 1 Puff by inhalation every six (6) hours as needed for Wheezing.     5. Recurrent major depressive disorder, in partial remission ([facility]    6. Hyperlipidemia, unspecified hyperlipidemia type  -     LIPID PANEL; Future  -     METABOLIC PANEL, COMPREHENSIVE; Future  -     CBC WITH AUTOMATED DIFF; Future  -     URINALYSIS W/MICROSCOPIC; Future    7. Anxiety    8. Breast cancer screening by mammogram  -     [facility] 3D FADIA W MAMMO BI DX INCL CAD; Future    9. Family history of breast cancer  -     [facility] 3D FADIA W MAMMO BI DX INCL CAD; Future    10. Dense breast tissue  -     [facility] 3D FADIA W MAMMO BI DX INCL CAD; Future     Mood disorder/anxiety are very well controlled. She is back to wearing a mask in public places due to concerns over Norovirus which I agree is prudent. Overall I am extremely pleased with her current condition both neurologically with MRI showing no advancement of MS or new lesions done in fall with neuro and new psych NP has gotten her on a very appropriate and successful mental health regiment. Will obtain labs for her to obtain for lipids and CMP. Follow up in 6 months for [address]    I have discussed the diagnosis with the patient and the intended plan as seen in the above orders. Patient is in agreement. The patient has received an after-visit summary and questions were answered concerning future plans. I have discussed medication side effects and warnings with the patient as well.     Pippa Arciniega PA-C

## 2023-04-01 LAB
ALBUMIN SERPL-MCNC: 4.3 G/DL (ref 3.8–4.8)
ALBUMIN/GLOB SERPL: 2.4 {RATIO} (ref 1.2–2.2)
ALP SERPL-CCNC: 106 IU/L (ref 44–121)
ALT SERPL-CCNC: 17 IU/L (ref 0–32)
APPEARANCE UR: CLEAR
AST SERPL-CCNC: 19 IU/L (ref 0–40)
BACTERIA #/AREA URNS HPF: NORMAL /[HPF]
BASOPHILS # BLD AUTO: 0.1 X10E3/UL (ref 0–0.2)
BASOPHILS NFR BLD AUTO: 1 %
BILIRUB SERPL-MCNC: 0.3 MG/DL (ref 0–1.2)
BILIRUB UR QL STRIP: NEGATIVE
BUN SERPL-MCNC: 9 MG/DL (ref 8–27)
BUN/CREAT SERPL: 14 (ref 12–28)
CALCIUM SERPL-MCNC: 10.1 MG/DL (ref 8.7–10.3)
CASTS URNS QL MICRO: NORMAL /LPF
CHLORIDE SERPL-SCNC: 104 MMOL/L (ref 96–106)
CHOLEST SERPL-MCNC: 158 MG/DL (ref 100–199)
CO2 SERPL-SCNC: 24 MMOL/L (ref 20–29)
COLOR UR: YELLOW
CREAT SERPL-MCNC: 0.65 MG/DL (ref 0.57–1)
EGFRCR SERPLBLD CKD-EPI 2021: 97 ML/MIN/1.73
EOSINOPHIL # BLD AUTO: 0.3 X10E3/UL (ref 0–0.4)
EOSINOPHIL NFR BLD AUTO: 5 %
EPI CELLS #/AREA URNS HPF: NORMAL /HPF (ref 0–10)
ERYTHROCYTE [DISTWIDTH] IN BLOOD BY AUTOMATED COUNT: 11.9 % (ref 11.7–15.4)
GLOBULIN SER CALC-MCNC: 1.8 G/DL (ref 1.5–4.5)
GLUCOSE SERPL-MCNC: 90 MG/DL (ref 70–99)
GLUCOSE UR QL STRIP: NEGATIVE
HCT VFR BLD AUTO: 43.3 % (ref 34–46.6)
HDLC SERPL-MCNC: 61 MG/DL
HGB BLD-MCNC: 13.9 G/DL (ref 11.1–15.9)
HGB UR QL STRIP: NEGATIVE
IMM GRANULOCYTES # BLD AUTO: 0 X10E3/UL (ref 0–0.1)
IMM GRANULOCYTES NFR BLD AUTO: 0 %
KETONES UR QL STRIP: NEGATIVE
LDLC SERPL CALC-MCNC: 83 MG/DL (ref 0–99)
LEUKOCYTE ESTERASE UR QL STRIP: NEGATIVE
LYMPHOCYTES # BLD AUTO: 2 X10E3/UL (ref 0.7–3.1)
LYMPHOCYTES NFR BLD AUTO: 32 %
MCH RBC QN AUTO: 29.4 PG (ref 26.6–33)
MCHC RBC AUTO-ENTMCNC: 32.1 G/DL (ref 31.5–35.7)
MCV RBC AUTO: 92 FL (ref 79–97)
MICRO URNS: NORMAL
MICRO URNS: NORMAL
MONOCYTES # BLD AUTO: 0.7 X10E3/UL (ref 0.1–0.9)
MONOCYTES NFR BLD AUTO: 11 %
NEUTROPHILS # BLD AUTO: 3.3 X10E3/UL (ref 1.4–7)
NEUTROPHILS NFR BLD AUTO: 51 %
NITRITE UR QL STRIP: NEGATIVE
PH UR STRIP: 7.5 [PH] (ref 5–7.5)
PLATELET # BLD AUTO: 204 X10E3/UL (ref 150–450)
POTASSIUM SERPL-SCNC: 4.4 MMOL/L (ref 3.5–5.2)
PROT SERPL-MCNC: 6.1 G/DL (ref 6–8.5)
PROT UR QL STRIP: NEGATIVE
RBC # BLD AUTO: 4.73 X10E6/UL (ref 3.77–5.28)
RBC #/AREA URNS HPF: NORMAL /HPF (ref 0–2)
SODIUM SERPL-SCNC: 139 MMOL/L (ref 134–144)
SP GR UR STRIP: 1.01 (ref 1–1.03)
TRIGL SERPL-MCNC: 73 MG/DL (ref 0–149)
UROBILINOGEN UR STRIP-MCNC: 0.2 MG/DL (ref 0.2–1)
VLDLC SERPL CALC-MCNC: 14 MG/DL (ref 5–40)
WBC # BLD AUTO: 6.4 X10E3/UL (ref 3.4–10.8)
WBC #/AREA URNS HPF: NORMAL /HPF (ref 0–5)

## 2023-04-23 DIAGNOSIS — Z12.31 BREAST CANCER SCREENING BY MAMMOGRAM: Primary | ICD-10-CM

## 2023-04-23 DIAGNOSIS — Z80.3 FAMILY HISTORY OF BREAST CANCER: ICD-10-CM

## 2023-04-23 DIAGNOSIS — R92.2 DENSE BREAST TISSUE: ICD-10-CM

## 2023-05-02 ENCOUNTER — HOSPITAL ENCOUNTER (OUTPATIENT)
Dept: MAMMOGRAPHY | Age: 68
Discharge: HOME | End: 2023-05-02
Attending: PHYSICIAN ASSISTANT
Payer: MEDICARE

## 2023-05-02 DIAGNOSIS — Z80.3 FAMILY HISTORY OF BREAST CANCER: ICD-10-CM

## 2023-05-02 DIAGNOSIS — Z12.31 BREAST CANCER SCREENING BY MAMMOGRAM: ICD-10-CM

## 2023-05-02 DIAGNOSIS — R92.2 DENSE BREAST TISSUE: ICD-10-CM

## 2023-05-02 PROCEDURE — 77063 BREAST TOMOSYNTHESIS BI: CPT

## 2023-05-30 RX ORDER — FLUTICASONE PROPIONATE 50 MCG
SPRAY, SUSPENSION (ML) NASAL
Qty: 16 G | Refills: 5 | Status: SHIPPED | OUTPATIENT
Start: 2023-05-30

## 2023-09-15 ENCOUNTER — OFFICE VISIT (OUTPATIENT)
Facility: CLINIC | Age: 68
End: 2023-09-15
Payer: MEDICARE

## 2023-09-15 VITALS
OXYGEN SATURATION: 94 % | SYSTOLIC BLOOD PRESSURE: 109 MMHG | RESPIRATION RATE: 16 BRPM | HEART RATE: 67 BPM | BODY MASS INDEX: 28.82 KG/M2 | TEMPERATURE: 97.5 F | DIASTOLIC BLOOD PRESSURE: 67 MMHG | WEIGHT: 168.8 LBS | HEIGHT: 64 IN

## 2023-09-15 DIAGNOSIS — F31.81 BIPOLAR 2 DISORDER (HCC): ICD-10-CM

## 2023-09-15 DIAGNOSIS — I25.119 ATHEROSCLEROSIS OF NATIVE CORONARY ARTERY OF NATIVE HEART WITH ANGINA PECTORIS (HCC): ICD-10-CM

## 2023-09-15 DIAGNOSIS — H70.001 ACUTE MASTOIDITIS OF RIGHT SIDE WITHOUT COMPLICATIONS: ICD-10-CM

## 2023-09-15 DIAGNOSIS — Z00.00 ENCOUNTER FOR SUBSEQUENT ANNUAL WELLNESS VISIT (AWV) IN MEDICARE PATIENT: Primary | ICD-10-CM

## 2023-09-15 DIAGNOSIS — F33.41 RECURRENT MAJOR DEPRESSIVE DISORDER, IN PARTIAL REMISSION (HCC): ICD-10-CM

## 2023-09-15 DIAGNOSIS — F41.9 ANXIETY: ICD-10-CM

## 2023-09-15 DIAGNOSIS — G35 MULTIPLE SCLEROSIS, RELAPSING-REMITTING (HCC): ICD-10-CM

## 2023-09-15 DIAGNOSIS — E78.2 MIXED HYPERLIPIDEMIA: ICD-10-CM

## 2023-09-15 PROCEDURE — G8399 PT W/DXA RESULTS DOCUMENT: HCPCS | Performed by: PHYSICIAN ASSISTANT

## 2023-09-15 PROCEDURE — 1036F TOBACCO NON-USER: CPT | Performed by: PHYSICIAN ASSISTANT

## 2023-09-15 PROCEDURE — 99214 OFFICE O/P EST MOD 30 MIN: CPT | Performed by: PHYSICIAN ASSISTANT

## 2023-09-15 PROCEDURE — G8419 CALC BMI OUT NRM PARAM NOF/U: HCPCS | Performed by: PHYSICIAN ASSISTANT

## 2023-09-15 PROCEDURE — 1090F PRES/ABSN URINE INCON ASSESS: CPT | Performed by: PHYSICIAN ASSISTANT

## 2023-09-15 PROCEDURE — 1123F ACP DISCUSS/DSCN MKR DOCD: CPT | Performed by: PHYSICIAN ASSISTANT

## 2023-09-15 PROCEDURE — G0439 PPPS, SUBSEQ VISIT: HCPCS | Performed by: PHYSICIAN ASSISTANT

## 2023-09-15 PROCEDURE — 3017F COLORECTAL CA SCREEN DOC REV: CPT | Performed by: PHYSICIAN ASSISTANT

## 2023-09-15 PROCEDURE — G8427 DOCREV CUR MEDS BY ELIG CLIN: HCPCS | Performed by: PHYSICIAN ASSISTANT

## 2023-09-15 RX ORDER — TRAZODONE HYDROCHLORIDE 50 MG/1
TABLET ORAL
COMMUNITY
Start: 2023-08-14

## 2023-09-15 RX ORDER — AMOXICILLIN AND CLAVULANATE POTASSIUM 875; 125 MG/1; MG/1
1 TABLET, FILM COATED ORAL 2 TIMES DAILY
Qty: 20 TABLET | Refills: 0 | Status: SHIPPED | OUTPATIENT
Start: 2023-09-15 | End: 2023-09-25

## 2023-09-15 RX ORDER — LAMOTRIGINE 200 MG/1
200 TABLET ORAL
COMMUNITY
Start: 2023-09-09

## 2023-09-15 SDOH — ECONOMIC STABILITY: FOOD INSECURITY: WITHIN THE PAST 12 MONTHS, THE FOOD YOU BOUGHT JUST DIDN'T LAST AND YOU DIDN'T HAVE MONEY TO GET MORE.: NEVER TRUE

## 2023-09-15 SDOH — ECONOMIC STABILITY: FOOD INSECURITY: WITHIN THE PAST 12 MONTHS, YOU WORRIED THAT YOUR FOOD WOULD RUN OUT BEFORE YOU GOT MONEY TO BUY MORE.: NEVER TRUE

## 2023-09-15 SDOH — ECONOMIC STABILITY: INCOME INSECURITY: HOW HARD IS IT FOR YOU TO PAY FOR THE VERY BASICS LIKE FOOD, HOUSING, MEDICAL CARE, AND HEATING?: NOT HARD AT ALL

## 2023-09-15 SDOH — ECONOMIC STABILITY: HOUSING INSECURITY
IN THE LAST 12 MONTHS, WAS THERE A TIME WHEN YOU DID NOT HAVE A STEADY PLACE TO SLEEP OR SLEPT IN A SHELTER (INCLUDING NOW)?: NO

## 2023-09-15 ASSESSMENT — PATIENT HEALTH QUESTIONNAIRE - PHQ9
SUM OF ALL RESPONSES TO PHQ9 QUESTIONS 1 & 2: 2
7. TROUBLE CONCENTRATING ON THINGS, SUCH AS READING THE NEWSPAPER OR WATCHING TELEVISION: 1
8. MOVING OR SPEAKING SO SLOWLY THAT OTHER PEOPLE COULD HAVE NOTICED. OR THE OPPOSITE, BEING SO FIGETY OR RESTLESS THAT YOU HAVE BEEN MOVING AROUND A LOT MORE THAN USUAL: 1
6. FEELING BAD ABOUT YOURSELF - OR THAT YOU ARE A FAILURE OR HAVE LET YOURSELF OR YOUR FAMILY DOWN: 1
4. FEELING TIRED OR HAVING LITTLE ENERGY: 1
2. FEELING DOWN, DEPRESSED OR HOPELESS: 1
SUM OF ALL RESPONSES TO PHQ QUESTIONS 1-9: 7
9. THOUGHTS THAT YOU WOULD BE BETTER OFF DEAD, OR OF HURTING YOURSELF: 0
SUM OF ALL RESPONSES TO PHQ QUESTIONS 1-9: 7
5. POOR APPETITE OR OVEREATING: 0
1. LITTLE INTEREST OR PLEASURE IN DOING THINGS: 1
SUM OF ALL RESPONSES TO PHQ QUESTIONS 1-9: 7
3. TROUBLE FALLING OR STAYING ASLEEP: 1
10. IF YOU CHECKED OFF ANY PROBLEMS, HOW DIFFICULT HAVE THESE PROBLEMS MADE IT FOR YOU TO DO YOUR WORK, TAKE CARE OF THINGS AT HOME, OR GET ALONG WITH OTHER PEOPLE: 2
SUM OF ALL RESPONSES TO PHQ QUESTIONS 1-9: 7

## 2023-09-15 ASSESSMENT — LIFESTYLE VARIABLES
HOW MANY STANDARD DRINKS CONTAINING ALCOHOL DO YOU HAVE ON A TYPICAL DAY: PATIENT DOES NOT DRINK
HOW OFTEN DO YOU HAVE A DRINK CONTAINING ALCOHOL: NEVER

## 2023-09-15 NOTE — PROGRESS NOTES
Medicare Annual Wellness Visit    Andre Suarez is here for Medicare AWV (Room 4B // )    Assessment & Plan   Encounter for subsequent annual wellness visit (AWV) in Medicare patient  Multiple sclerosis, relapsing-remitting (HCC)--stable, continue Aubagio 14 mg and neuro follow up  Recurrent major depressive disorder, in partial remission (HCC)-bipolar 2 treatment working well. Works with therapist  Bipolar 2 disorder (HCC)-see above    Mixed hyperlipidemia--at goal on lipitor 20 mg    Anxiety--stable working with anxiety    Atherosclerosis of native coronary artery of native heart with angina pectoris (HCC)--stable on lipitor and asa 81  Acute mastoiditis of right side without complications  -     [facility] - Rosario Bell MD, Otolaryngology, [place]  -     amoxicillin-clavulanate (AUGMENTIN) 875-125 MG per tablet; Take 1 tablet by mouth 2 times daily for 10 days, Disp-20 tablet, R-0Normal  Treat with augmentin for 10 days, has follow up set up with ENT    Recommendations for Preventive Services Due: see orders and patient instructions/AVS.  Recommended screening schedule for the next 5-10 years is provided to the patient in written form: see Patient Instructions/AVS.     No follow-ups on file. Subjective   The following acute and/or chronic problems were also addressed today:    Bipolar depression: working with psychiatry and therapist. Doing very well overall. Feels she over did it this summer with family, though she quite enjoyed it. She was feeling good but now feels she needs some time to herself. Feels stable in terms of her mood, had been playing with medicine with psychiatry for a while. Patient has has hx of HLD. Takes 20 mg. No RUQ pain, no jaundice, no muscle aches. MS: Follows with neuro at [facility]. Continues on Aubagio. During recent MRI she had some acute R ear pain when placing the ear bud in. MRI then showed inflammation and fluid at R mastoid process. No persistent ear pain or hearing change.

## 2023-09-18 ENCOUNTER — APPOINTMENT (OUTPATIENT)
Facility: HOSPITAL | Age: 68
End: 2023-09-18
Payer: MEDICARE

## 2023-09-18 ENCOUNTER — HOSPITAL ENCOUNTER (EMERGENCY)
Facility: HOSPITAL | Age: 68
Discharge: HOME | End: 2023-09-18
Attending: STUDENT IN AN ORGANIZED HEALTH CARE EDUCATION/TRAINING PROGRAM
Payer: MEDICARE

## 2023-09-18 VITALS
OXYGEN SATURATION: 94 % | RESPIRATION RATE: 16 BRPM | DIASTOLIC BLOOD PRESSURE: 81 MMHG | BODY MASS INDEX: 28.64 KG/M2 | TEMPERATURE: 98.2 F | WEIGHT: 167.77 LBS | SYSTOLIC BLOOD PRESSURE: 120 MMHG | HEIGHT: 64 IN | HEART RATE: 85 BPM

## 2023-09-18 DIAGNOSIS — R42 DIZZINESS: ICD-10-CM

## 2023-09-18 DIAGNOSIS — H92.01 RIGHT EAR PAIN: Primary | ICD-10-CM

## 2023-09-18 LAB
ALBUMIN SERPL-MCNC: 3.8 G/DL (ref 3.5–5)
ALBUMIN/GLOB SERPL: 1.1 (ref 1.1–2.2)
ALP SERPL-CCNC: 128 U/L (ref 45–117)
ALT SERPL-CCNC: 37 U/L (ref 12–78)
ANION GAP SERPL CALC-SCNC: 2 MMOL/L (ref 5–15)
AST SERPL-CCNC: 23 U/L (ref 15–37)
BASOPHILS # BLD: 0.1 K/UL (ref 0–0.1)
BASOPHILS NFR BLD: 1 % (ref 0–1)
BILIRUB SERPL-MCNC: 0.3 MG/DL (ref 0.2–1)
BUN SERPL-MCNC: 11 MG/DL (ref 6–20)
BUN/CREAT SERPL: 14 (ref 12–20)
CALCIUM SERPL-MCNC: 10.2 MG/DL (ref 8.5–10.1)
CHLORIDE SERPL-SCNC: 108 MMOL/L (ref 97–108)
CO2 SERPL-SCNC: 28 MMOL/L (ref 21–32)
CREAT SERPL-MCNC: 0.76 MG/DL (ref 0.55–1.02)
DIFFERENTIAL METHOD BLD: NORMAL
EOSINOPHIL # BLD: 0.4 K/UL (ref 0–0.4)
EOSINOPHIL NFR BLD: 5 % (ref 0–7)
ERYTHROCYTE [DISTWIDTH] IN BLOOD BY AUTOMATED COUNT: 13.2 % (ref 11.5–14.5)
GLOBULIN SER CALC-MCNC: 3.4 G/DL (ref 2–4)
GLUCOSE SERPL-MCNC: 96 MG/DL (ref 65–100)
HCT VFR BLD AUTO: 41.6 % (ref 35–47)
HGB BLD-MCNC: 13.8 G/DL (ref 11.5–16)
IMM GRANULOCYTES # BLD AUTO: 0 K/UL (ref 0–0.04)
IMM GRANULOCYTES NFR BLD AUTO: 0 % (ref 0–0.5)
LYMPHOCYTES # BLD: 1.9 K/UL (ref 0.8–3.5)
LYMPHOCYTES NFR BLD: 28 % (ref 12–49)
MCH RBC QN AUTO: 30.9 PG (ref 26–34)
MCHC RBC AUTO-ENTMCNC: 33.2 G/DL (ref 30–36.5)
MCV RBC AUTO: 93.1 FL (ref 80–99)
MONOCYTES # BLD: 0.7 K/UL (ref 0–1)
MONOCYTES NFR BLD: 10 % (ref 5–13)
NEUTS SEG # BLD: 4 K/UL (ref 1.8–8)
NEUTS SEG NFR BLD: 56 % (ref 32–75)
NRBC # BLD: 0 K/UL (ref 0–0.01)
NRBC BLD-RTO: 0 PER 100 WBC
PLATELET # BLD AUTO: 218 K/UL (ref 150–400)
PMV BLD AUTO: 9.6 FL (ref 8.9–12.9)
POTASSIUM SERPL-SCNC: 4.2 MMOL/L (ref 3.5–5.1)
PROT SERPL-MCNC: 7.2 G/DL (ref 6.4–8.2)
RBC # BLD AUTO: 4.47 M/UL (ref 3.8–5.2)
SODIUM SERPL-SCNC: 138 MMOL/L (ref 136–145)
WBC # BLD AUTO: 7 K/UL (ref 3.6–11)

## 2023-09-18 PROCEDURE — 85025 COMPLETE CBC W/AUTO DIFF WBC: CPT

## 2023-09-18 PROCEDURE — 36415 COLL VENOUS BLD VENIPUNCTURE: CPT

## 2023-09-18 PROCEDURE — 80053 COMPREHEN METABOLIC PANEL: CPT

## 2023-09-18 PROCEDURE — 96365 THER/PROPH/DIAG IV INF INIT: CPT

## 2023-09-18 PROCEDURE — 6360000004 HC RX CONTRAST MEDICATION: Performed by: STUDENT IN AN ORGANIZED HEALTH CARE EDUCATION/TRAINING PROGRAM

## 2023-09-18 PROCEDURE — 6360000002 HC RX W HCPCS: Performed by: STUDENT IN AN ORGANIZED HEALTH CARE EDUCATION/TRAINING PROGRAM

## 2023-09-18 PROCEDURE — 99285 EMERGENCY DEPT VISIT HI MDM: CPT

## 2023-09-18 PROCEDURE — 87040 BLOOD CULTURE FOR BACTERIA: CPT

## 2023-09-18 PROCEDURE — 70481 CT ORBIT/EAR/FOSSA W/DYE: CPT

## 2023-09-18 RX ORDER — ATORVASTATIN CALCIUM 20 MG/1
20 TABLET, FILM COATED ORAL DAILY
Qty: 90 TABLET | Refills: 3 | Status: SHIPPED | OUTPATIENT
Start: 2023-09-18

## 2023-09-18 RX ORDER — CLINDAMYCIN PHOSPHATE 900 MG/50ML
900 INJECTION INTRAVENOUS ONCE
Status: COMPLETED | OUTPATIENT
Start: 2023-09-18 | End: 2023-09-18

## 2023-09-18 RX ADMIN — CLINDAMYCIN PHOSPHATE 900 MG: 900 INJECTION, SOLUTION INTRAVENOUS at 16:08

## 2023-09-18 RX ADMIN — IOPAMIDOL 100 ML: 755 INJECTION, SOLUTION INTRAVENOUS at 16:31

## 2023-09-18 ASSESSMENT — PAIN SCALES - GENERAL: PAINLEVEL_OUTOF10: 1

## 2023-09-18 NOTE — DISCHARGE INSTRUCTIONS
Patient understands that this still may have an early presentation of an emergent medical condition that will require a recheck. I specifically discussed return precautions with the patient and they agree to follow up as an outpatient or return to the ER if symptoms do not improve, change or worsen.

## 2023-09-18 NOTE — TELEPHONE ENCOUNTER
PCP: González Reveles PA-C     Last appt:  9/15/2023      Future Appointments   Date Time Provider [facility] [address]   3/15/2024  9:00 AM González Reveles PA-C [facility] BS AMB          Requested Prescriptions     Pending Prescriptions Disp Refills    atorvastatin (LIPITOR) 20 MG tablet [Pharmacy Med Name: Atorvastatin Calcium 20 MG Oral Tablet] 90 tablet 0     Sig: Take 1 tablet by mouth once daily

## 2023-09-24 LAB
BACTERIA SPEC CULT: NORMAL
BACTERIA SPEC CULT: NORMAL
SERVICE CMNT-IMP: NORMAL
SERVICE CMNT-IMP: NORMAL

## 2023-09-27 ENCOUNTER — TELEPHONE (OUTPATIENT)
Facility: CLINIC | Age: 68
End: 2023-09-27

## 2023-09-27 NOTE — TELEPHONE ENCOUNTER
[place] [facility] would like to know if provider has a copy of pt latest MRI, please call back at [phone]

## 2024-03-14 ENCOUNTER — HOSPITAL ENCOUNTER (INPATIENT)
Facility: HOSPITAL | Age: 69
LOS: 2 days | Discharge: HOME HEALTH | End: 2024-03-16
Attending: STUDENT IN AN ORGANIZED HEALTH CARE EDUCATION/TRAINING PROGRAM | Admitting: STUDENT IN AN ORGANIZED HEALTH CARE EDUCATION/TRAINING PROGRAM
Payer: MEDICARE

## 2024-03-14 ENCOUNTER — APPOINTMENT (OUTPATIENT)
Facility: HOSPITAL | Age: 69
End: 2024-03-14
Payer: MEDICARE

## 2024-03-14 DIAGNOSIS — R55 SYNCOPE AND COLLAPSE: Primary | ICD-10-CM

## 2024-03-14 LAB
ALBUMIN SERPL-MCNC: 3.8 G/DL (ref 3.5–5)
ALBUMIN/GLOB SERPL: 1.6 (ref 1.1–2.2)
ALP SERPL-CCNC: 135 U/L (ref 45–117)
ALT SERPL-CCNC: 33 U/L (ref 12–78)
ANION GAP SERPL CALC-SCNC: 1 MMOL/L (ref 5–15)
AST SERPL-CCNC: 19 U/L (ref 15–37)
BASOPHILS # BLD: 0.1 K/UL (ref 0–0.1)
BASOPHILS NFR BLD: 1 % (ref 0–1)
BILIRUB SERPL-MCNC: 0.3 MG/DL (ref 0.2–1)
BUN SERPL-MCNC: 14 MG/DL (ref 6–20)
BUN/CREAT SERPL: 20 (ref 12–20)
CALCIUM SERPL-MCNC: 10 MG/DL (ref 8.5–10.1)
CHLORIDE SERPL-SCNC: 108 MMOL/L (ref 97–108)
CO2 SERPL-SCNC: 31 MMOL/L (ref 21–32)
CREAT SERPL-MCNC: 0.71 MG/DL (ref 0.55–1.02)
DIFFERENTIAL METHOD BLD: NORMAL
EOSINOPHIL # BLD: 0.2 K/UL (ref 0–0.4)
EOSINOPHIL NFR BLD: 3 % (ref 0–7)
ERYTHROCYTE [DISTWIDTH] IN BLOOD BY AUTOMATED COUNT: 12.4 % (ref 11.5–14.5)
GLOBULIN SER CALC-MCNC: 2.4 G/DL (ref 2–4)
GLUCOSE SERPL-MCNC: 92 MG/DL (ref 65–100)
HCT VFR BLD AUTO: 40.9 % (ref 35–47)
HGB BLD-MCNC: 13.1 G/DL (ref 11.5–16)
IMM GRANULOCYTES # BLD AUTO: 0 K/UL (ref 0–0.04)
IMM GRANULOCYTES NFR BLD AUTO: 0 % (ref 0–0.5)
LYMPHOCYTES # BLD: 3 K/UL (ref 0.8–3.5)
LYMPHOCYTES NFR BLD: 45 % (ref 12–49)
MCH RBC QN AUTO: 30.5 PG (ref 26–34)
MCHC RBC AUTO-ENTMCNC: 32 G/DL (ref 30–36.5)
MCV RBC AUTO: 95.3 FL (ref 80–99)
MONOCYTES # BLD: 0.9 K/UL (ref 0–1)
MONOCYTES NFR BLD: 12 % (ref 5–13)
NEUTS SEG # BLD: 2.7 K/UL (ref 1.8–8)
NEUTS SEG NFR BLD: 39 % (ref 32–75)
NRBC # BLD: 0 K/UL (ref 0–0.01)
NRBC BLD-RTO: 0 PER 100 WBC
PLATELET # BLD AUTO: 215 K/UL (ref 150–400)
PMV BLD AUTO: 8.9 FL (ref 8.9–12.9)
POTASSIUM SERPL-SCNC: 4.1 MMOL/L (ref 3.5–5.1)
PROT SERPL-MCNC: 6.2 G/DL (ref 6.4–8.2)
RBC # BLD AUTO: 4.29 M/UL (ref 3.8–5.2)
SODIUM SERPL-SCNC: 140 MMOL/L (ref 136–145)
TROPONIN I SERPL HS-MCNC: 5 NG/L (ref 0–51)
TROPONIN I SERPL HS-MCNC: 5 NG/L (ref 0–51)
WBC # BLD AUTO: 6.9 K/UL (ref 3.6–11)

## 2024-03-14 PROCEDURE — 2580000003 HC RX 258: Performed by: STUDENT IN AN ORGANIZED HEALTH CARE EDUCATION/TRAINING PROGRAM

## 2024-03-14 PROCEDURE — 72125 CT NECK SPINE W/O DYE: CPT

## 2024-03-14 PROCEDURE — 36415 COLL VENOUS BLD VENIPUNCTURE: CPT

## 2024-03-14 PROCEDURE — 1100000003 HC PRIVATE W/ TELEMETRY

## 2024-03-14 PROCEDURE — 85025 COMPLETE CBC W/AUTO DIFF WBC: CPT

## 2024-03-14 PROCEDURE — 70450 CT HEAD/BRAIN W/O DYE: CPT

## 2024-03-14 PROCEDURE — 81001 URINALYSIS AUTO W/SCOPE: CPT

## 2024-03-14 PROCEDURE — 84484 ASSAY OF TROPONIN QUANT: CPT

## 2024-03-14 PROCEDURE — 70486 CT MAXILLOFACIAL W/O DYE: CPT

## 2024-03-14 PROCEDURE — 80053 COMPREHEN METABOLIC PANEL: CPT

## 2024-03-14 PROCEDURE — 99285 EMERGENCY DEPT VISIT HI MDM: CPT

## 2024-03-14 PROCEDURE — 94761 N-INVAS EAR/PLS OXIMETRY MLT: CPT

## 2024-03-14 PROCEDURE — 71045 X-RAY EXAM CHEST 1 VIEW: CPT

## 2024-03-14 PROCEDURE — 6370000000 HC RX 637 (ALT 250 FOR IP): Performed by: STUDENT IN AN ORGANIZED HEALTH CARE EDUCATION/TRAINING PROGRAM

## 2024-03-14 RX ORDER — ENOXAPARIN SODIUM 100 MG/ML
40 INJECTION SUBCUTANEOUS DAILY
Status: DISCONTINUED | OUTPATIENT
Start: 2024-03-15 | End: 2024-03-16 | Stop reason: HOSPADM

## 2024-03-14 RX ORDER — ONDANSETRON 4 MG/1
4 TABLET, ORALLY DISINTEGRATING ORAL EVERY 8 HOURS PRN
Status: DISCONTINUED | OUTPATIENT
Start: 2024-03-14 | End: 2024-03-16 | Stop reason: HOSPADM

## 2024-03-14 RX ORDER — LAMOTRIGINE 100 MG/1
200 TABLET ORAL
Status: DISCONTINUED | OUTPATIENT
Start: 2024-03-14 | End: 2024-03-16 | Stop reason: HOSPADM

## 2024-03-14 RX ORDER — ASPIRIN 81 MG/1
81 TABLET ORAL DAILY
Status: DISCONTINUED | OUTPATIENT
Start: 2024-03-15 | End: 2024-03-16 | Stop reason: HOSPADM

## 2024-03-14 RX ORDER — BUPROPION HYDROCHLORIDE 150 MG/1
300 TABLET ORAL EVERY MORNING
Status: DISCONTINUED | OUTPATIENT
Start: 2024-03-15 | End: 2024-03-16 | Stop reason: HOSPADM

## 2024-03-14 RX ORDER — CITALOPRAM 20 MG/1
30 TABLET ORAL DAILY
Status: DISCONTINUED | OUTPATIENT
Start: 2024-03-15 | End: 2024-03-16 | Stop reason: HOSPADM

## 2024-03-14 RX ORDER — HYDROXYZINE HYDROCHLORIDE 10 MG/1
10 TABLET, FILM COATED ORAL NIGHTLY PRN
Status: DISCONTINUED | OUTPATIENT
Start: 2024-03-14 | End: 2024-03-16 | Stop reason: HOSPADM

## 2024-03-14 RX ORDER — ONDANSETRON 2 MG/ML
4 INJECTION INTRAMUSCULAR; INTRAVENOUS EVERY 6 HOURS PRN
Status: DISCONTINUED | OUTPATIENT
Start: 2024-03-14 | End: 2024-03-16 | Stop reason: HOSPADM

## 2024-03-14 RX ORDER — TERIFLUNOMIDE 14 MG/1
1 TABLET, FILM COATED ORAL DAILY
Status: DISCONTINUED | OUTPATIENT
Start: 2024-03-15 | End: 2024-03-16 | Stop reason: HOSPADM

## 2024-03-14 RX ORDER — ACETAMINOPHEN 325 MG/1
650 TABLET ORAL EVERY 6 HOURS PRN
Status: DISCONTINUED | OUTPATIENT
Start: 2024-03-14 | End: 2024-03-16 | Stop reason: HOSPADM

## 2024-03-14 RX ORDER — SODIUM CHLORIDE 9 MG/ML
INJECTION, SOLUTION INTRAVENOUS PRN
Status: DISCONTINUED | OUTPATIENT
Start: 2024-03-14 | End: 2024-03-16 | Stop reason: HOSPADM

## 2024-03-14 RX ORDER — ACETAMINOPHEN 650 MG/1
650 SUPPOSITORY RECTAL EVERY 6 HOURS PRN
Status: DISCONTINUED | OUTPATIENT
Start: 2024-03-14 | End: 2024-03-16 | Stop reason: HOSPADM

## 2024-03-14 RX ORDER — SODIUM CHLORIDE 9 MG/ML
INJECTION, SOLUTION INTRAVENOUS CONTINUOUS
Status: ACTIVE | OUTPATIENT
Start: 2024-03-14 | End: 2024-03-15

## 2024-03-14 RX ORDER — SODIUM CHLORIDE 0.9 % (FLUSH) 0.9 %
5-40 SYRINGE (ML) INJECTION PRN
Status: DISCONTINUED | OUTPATIENT
Start: 2024-03-14 | End: 2024-03-16 | Stop reason: HOSPADM

## 2024-03-14 RX ORDER — POLYETHYLENE GLYCOL 3350 17 G/17G
17 POWDER, FOR SOLUTION ORAL DAILY PRN
Status: DISCONTINUED | OUTPATIENT
Start: 2024-03-14 | End: 2024-03-16 | Stop reason: HOSPADM

## 2024-03-14 RX ORDER — ALBUTEROL SULFATE 2.5 MG/3ML
2.5 SOLUTION RESPIRATORY (INHALATION) EVERY 4 HOURS PRN
Status: DISCONTINUED | OUTPATIENT
Start: 2024-03-14 | End: 2024-03-16 | Stop reason: HOSPADM

## 2024-03-14 RX ORDER — ATORVASTATIN CALCIUM 20 MG/1
20 TABLET, FILM COATED ORAL DAILY
Status: DISCONTINUED | OUTPATIENT
Start: 2024-03-15 | End: 2024-03-16 | Stop reason: HOSPADM

## 2024-03-14 RX ORDER — SODIUM CHLORIDE 0.9 % (FLUSH) 0.9 %
5-40 SYRINGE (ML) INJECTION EVERY 12 HOURS SCHEDULED
Status: DISCONTINUED | OUTPATIENT
Start: 2024-03-14 | End: 2024-03-16 | Stop reason: HOSPADM

## 2024-03-14 RX ORDER — FLUTICASONE PROPIONATE 50 MCG
2 SPRAY, SUSPENSION (ML) NASAL DAILY
Status: DISCONTINUED | OUTPATIENT
Start: 2024-03-15 | End: 2024-03-16 | Stop reason: HOSPADM

## 2024-03-14 RX ORDER — BACLOFEN 10 MG/1
10 TABLET ORAL 2 TIMES DAILY
Status: DISCONTINUED | OUTPATIENT
Start: 2024-03-15 | End: 2024-03-16 | Stop reason: HOSPADM

## 2024-03-14 RX ADMIN — SODIUM CHLORIDE: 9 INJECTION, SOLUTION INTRAVENOUS at 23:07

## 2024-03-14 RX ADMIN — LAMOTRIGINE 200 MG: 100 TABLET ORAL at 23:31

## 2024-03-14 RX ADMIN — ACETAMINOPHEN 650 MG: 325 TABLET ORAL at 23:23

## 2024-03-14 RX ADMIN — SODIUM CHLORIDE: 9 INJECTION, SOLUTION INTRAVENOUS at 23:51

## 2024-03-14 RX ADMIN — SODIUM CHLORIDE, PRESERVATIVE FREE 10 ML: 5 INJECTION INTRAVENOUS at 23:32

## 2024-03-14 ASSESSMENT — PAIN DESCRIPTION - ORIENTATION: ORIENTATION: MID

## 2024-03-14 ASSESSMENT — PAIN DESCRIPTION - LOCATION
LOCATION: HEAD

## 2024-03-14 ASSESSMENT — PAIN SCALES - GENERAL
PAINLEVEL_OUTOF10: 5
PAINLEVEL_OUTOF10: 5
PAINLEVEL_OUTOF10: 2

## 2024-03-14 ASSESSMENT — PAIN DESCRIPTION - DESCRIPTORS
DESCRIPTORS: ACHING

## 2024-03-14 ASSESSMENT — PAIN - FUNCTIONAL ASSESSMENT: PAIN_FUNCTIONAL_ASSESSMENT: 0-10

## 2024-03-15 ENCOUNTER — APPOINTMENT (OUTPATIENT)
Facility: HOSPITAL | Age: 69
End: 2024-03-15
Payer: MEDICARE

## 2024-03-15 ENCOUNTER — APPOINTMENT (OUTPATIENT)
Facility: HOSPITAL | Age: 69
End: 2024-03-15
Attending: STUDENT IN AN ORGANIZED HEALTH CARE EDUCATION/TRAINING PROGRAM
Payer: MEDICARE

## 2024-03-15 PROBLEM — R55 CONVULSIVE SYNCOPE: Status: ACTIVE | Noted: 2024-03-15

## 2024-03-15 PROBLEM — R41.82 ACUTE ALTERATION IN MENTAL STATUS: Status: ACTIVE | Noted: 2024-03-15

## 2024-03-15 LAB
ANION GAP SERPL CALC-SCNC: 0 MMOL/L (ref 5–15)
APPEARANCE UR: CLEAR
BACTERIA URNS QL MICRO: NEGATIVE /HPF
BASOPHILS # BLD: 0.1 K/UL (ref 0–0.1)
BASOPHILS NFR BLD: 1 % (ref 0–1)
BILIRUB UR QL: NEGATIVE
BUN SERPL-MCNC: 10 MG/DL (ref 6–20)
BUN/CREAT SERPL: 18 (ref 12–20)
CALCIUM SERPL-MCNC: 9.4 MG/DL (ref 8.5–10.1)
CHLORIDE SERPL-SCNC: 112 MMOL/L (ref 97–108)
CO2 SERPL-SCNC: 27 MMOL/L (ref 21–32)
COLOR UR: NORMAL
CREAT SERPL-MCNC: 0.56 MG/DL (ref 0.55–1.02)
DIFFERENTIAL METHOD BLD: ABNORMAL
ECHO AO ROOT DIAM: 3.2 CM
ECHO AO ROOT INDEX: 1.75 CM/M2
ECHO AV AREA PEAK VELOCITY: 3.1 CM2
ECHO AV AREA/BSA PEAK VELOCITY: 1.7 CM2/M2
ECHO AV PEAK GRADIENT: 11 MMHG
ECHO AV PEAK VELOCITY: 1.6 M/S
ECHO AV VELOCITY RATIO: 1
ECHO BSA: 1.87 M2
ECHO LA DIAMETER INDEX: 1.86 CM/M2
ECHO LA DIAMETER: 3.4 CM
ECHO LA TO AORTIC ROOT RATIO: 1.06
ECHO LV FRACTIONAL SHORTENING: 32 % (ref 28–44)
ECHO LV INTERNAL DIMENSION DIASTOLE INDEX: 2.24 CM/M2
ECHO LV INTERNAL DIMENSION DIASTOLIC: 4.1 CM (ref 3.9–5.3)
ECHO LV INTERNAL DIMENSION SYSTOLIC INDEX: 1.53 CM/M2
ECHO LV INTERNAL DIMENSION SYSTOLIC: 2.8 CM
ECHO LV IVSD: 1 CM (ref 0.6–0.9)
ECHO LV MASS 2D: 132.1 G (ref 67–162)
ECHO LV MASS INDEX 2D: 72.2 G/M2 (ref 43–95)
ECHO LV POSTERIOR WALL DIASTOLIC: 1 CM (ref 0.6–0.9)
ECHO LV RELATIVE WALL THICKNESS RATIO: 0.49
ECHO LVOT AREA: 3.1 CM2
ECHO LVOT DIAM: 2 CM
ECHO LVOT PEAK GRADIENT: 10 MMHG
ECHO LVOT PEAK VELOCITY: 1.6 M/S
ECHO TV REGURGITANT MAX VELOCITY: 2.16 M/S
ECHO TV REGURGITANT PEAK GRADIENT: 19 MMHG
EKG ATRIAL RATE: 73 BPM
EKG DIAGNOSIS: NORMAL
EKG P AXIS: 74 DEGREES
EKG P-R INTERVAL: 206 MS
EKG Q-T INTERVAL: 400 MS
EKG QRS DURATION: 94 MS
EKG QTC CALCULATION (BAZETT): 440 MS
EKG R AXIS: 63 DEGREES
EKG T AXIS: 41 DEGREES
EKG VENTRICULAR RATE: 73 BPM
EOSINOPHIL # BLD: 0.2 K/UL (ref 0–0.4)
EOSINOPHIL NFR BLD: 4 % (ref 0–7)
EPITH CASTS URNS QL MICRO: NORMAL /LPF
ERYTHROCYTE [DISTWIDTH] IN BLOOD BY AUTOMATED COUNT: 12.4 % (ref 11.5–14.5)
GLUCOSE SERPL-MCNC: 92 MG/DL (ref 65–100)
GLUCOSE UR STRIP.AUTO-MCNC: NEGATIVE MG/DL
HCT VFR BLD AUTO: 40.6 % (ref 35–47)
HGB BLD-MCNC: 12.8 G/DL (ref 11.5–16)
HGB UR QL STRIP: NEGATIVE
HYALINE CASTS URNS QL MICRO: NORMAL /LPF (ref 0–2)
IMM GRANULOCYTES # BLD AUTO: 0 K/UL (ref 0–0.04)
IMM GRANULOCYTES NFR BLD AUTO: 1 % (ref 0–0.5)
KETONES UR QL STRIP.AUTO: NEGATIVE MG/DL
LEUKOCYTE ESTERASE UR QL STRIP.AUTO: NEGATIVE
LYMPHOCYTES # BLD: 2.6 K/UL (ref 0.8–3.5)
LYMPHOCYTES NFR BLD: 42 % (ref 12–49)
MCH RBC QN AUTO: 30.3 PG (ref 26–34)
MCHC RBC AUTO-ENTMCNC: 31.5 G/DL (ref 30–36.5)
MCV RBC AUTO: 96 FL (ref 80–99)
MONOCYTES # BLD: 0.7 K/UL (ref 0–1)
MONOCYTES NFR BLD: 11 % (ref 5–13)
NEUTS SEG # BLD: 2.6 K/UL (ref 1.8–8)
NEUTS SEG NFR BLD: 41 % (ref 32–75)
NITRITE UR QL STRIP.AUTO: NEGATIVE
NRBC # BLD: 0 K/UL (ref 0–0.01)
NRBC BLD-RTO: 0 PER 100 WBC
PH UR STRIP: 7 (ref 5–8)
PLATELET # BLD AUTO: 194 K/UL (ref 150–400)
PMV BLD AUTO: 8.9 FL (ref 8.9–12.9)
POTASSIUM SERPL-SCNC: 4.5 MMOL/L (ref 3.5–5.1)
PROT UR STRIP-MCNC: NEGATIVE MG/DL
RBC # BLD AUTO: 4.23 M/UL (ref 3.8–5.2)
RBC #/AREA URNS HPF: NORMAL /HPF (ref 0–5)
SODIUM SERPL-SCNC: 139 MMOL/L (ref 136–145)
SP GR UR REFRACTOMETRY: 1.01
URINE CULTURE IF INDICATED: NORMAL
UROBILINOGEN UR QL STRIP.AUTO: 0.2 EU/DL (ref 0.2–1)
WBC # BLD AUTO: 6.1 K/UL (ref 3.6–11)
WBC URNS QL MICRO: NORMAL /HPF (ref 0–4)

## 2024-03-15 PROCEDURE — 2580000003 HC RX 258: Performed by: STUDENT IN AN ORGANIZED HEALTH CARE EDUCATION/TRAINING PROGRAM

## 2024-03-15 PROCEDURE — 36415 COLL VENOUS BLD VENIPUNCTURE: CPT

## 2024-03-15 PROCEDURE — A9579 GAD-BASE MR CONTRAST NOS,1ML: HCPCS | Performed by: PSYCHIATRY & NEUROLOGY

## 2024-03-15 PROCEDURE — 85025 COMPLETE CBC W/AUTO DIFF WBC: CPT

## 2024-03-15 PROCEDURE — 80048 BASIC METABOLIC PNL TOTAL CA: CPT

## 2024-03-15 PROCEDURE — 99223 1ST HOSP IP/OBS HIGH 75: CPT | Performed by: PSYCHIATRY & NEUROLOGY

## 2024-03-15 PROCEDURE — 6360000004 HC RX CONTRAST MEDICATION: Performed by: PSYCHIATRY & NEUROLOGY

## 2024-03-15 PROCEDURE — 95816 EEG AWAKE AND DROWSY: CPT

## 2024-03-15 PROCEDURE — 95819 EEG AWAKE AND ASLEEP: CPT | Performed by: PSYCHIATRY & NEUROLOGY

## 2024-03-15 PROCEDURE — 1100000003 HC PRIVATE W/ TELEMETRY

## 2024-03-15 PROCEDURE — 6370000000 HC RX 637 (ALT 250 FOR IP): Performed by: STUDENT IN AN ORGANIZED HEALTH CARE EDUCATION/TRAINING PROGRAM

## 2024-03-15 PROCEDURE — 97535 SELF CARE MNGMENT TRAINING: CPT

## 2024-03-15 PROCEDURE — 6370000000 HC RX 637 (ALT 250 FOR IP): Performed by: PSYCHIATRY & NEUROLOGY

## 2024-03-15 PROCEDURE — 97162 PT EVAL MOD COMPLEX 30 MIN: CPT

## 2024-03-15 PROCEDURE — 93308 TTE F-UP OR LMTD: CPT

## 2024-03-15 PROCEDURE — 97165 OT EVAL LOW COMPLEX 30 MIN: CPT

## 2024-03-15 PROCEDURE — 97116 GAIT TRAINING THERAPY: CPT

## 2024-03-15 PROCEDURE — 6360000002 HC RX W HCPCS: Performed by: STUDENT IN AN ORGANIZED HEALTH CARE EDUCATION/TRAINING PROGRAM

## 2024-03-15 PROCEDURE — 70553 MRI BRAIN STEM W/O & W/DYE: CPT

## 2024-03-15 RX ORDER — LAMOTRIGINE 25 MG/1
50 TABLET ORAL EVERY MORNING
Status: DISCONTINUED | OUTPATIENT
Start: 2024-03-15 | End: 2024-03-16 | Stop reason: HOSPADM

## 2024-03-15 RX ADMIN — BUPROPION HYDROCHLORIDE 300 MG: 150 TABLET, EXTENDED RELEASE ORAL at 09:14

## 2024-03-15 RX ADMIN — GADOTERIDOL 15 ML: 279.3 INJECTION, SOLUTION INTRAVENOUS at 17:06

## 2024-03-15 RX ADMIN — SODIUM CHLORIDE, PRESERVATIVE FREE 10 ML: 5 INJECTION INTRAVENOUS at 09:16

## 2024-03-15 RX ADMIN — ACETAMINOPHEN 650 MG: 325 TABLET ORAL at 09:14

## 2024-03-15 RX ADMIN — ASPIRIN 81 MG: 81 TABLET, COATED ORAL at 09:14

## 2024-03-15 RX ADMIN — BACLOFEN 10 MG: 10 TABLET ORAL at 09:15

## 2024-03-15 RX ADMIN — ENOXAPARIN SODIUM 40 MG: 100 INJECTION SUBCUTANEOUS at 09:14

## 2024-03-15 RX ADMIN — SODIUM CHLORIDE, PRESERVATIVE FREE 10 ML: 5 INJECTION INTRAVENOUS at 20:54

## 2024-03-15 RX ADMIN — LAMOTRIGINE 50 MG: 25 TABLET ORAL at 12:53

## 2024-03-15 RX ADMIN — LAMOTRIGINE 200 MG: 100 TABLET ORAL at 20:46

## 2024-03-15 RX ADMIN — ACETAMINOPHEN 650 MG: 325 TABLET ORAL at 20:49

## 2024-03-15 RX ADMIN — CITALOPRAM HYDROBROMIDE 30 MG: 20 TABLET ORAL at 09:14

## 2024-03-15 RX ADMIN — ATORVASTATIN CALCIUM 20 MG: 20 TABLET, FILM COATED ORAL at 09:15

## 2024-03-15 RX ADMIN — BACLOFEN 10 MG: 10 TABLET ORAL at 20:48

## 2024-03-15 ASSESSMENT — PAIN DESCRIPTION - DESCRIPTORS
DESCRIPTORS: THROBBING;STABBING
DESCRIPTORS: STABBING;THROBBING

## 2024-03-15 ASSESSMENT — PAIN SCALES - GENERAL
PAINLEVEL_OUTOF10: 2
PAINLEVEL_OUTOF10: 5
PAINLEVEL_OUTOF10: 5
PAINLEVEL_OUTOF10: 2

## 2024-03-15 ASSESSMENT — PAIN DESCRIPTION - LOCATION
LOCATION: HEAD

## 2024-03-15 ASSESSMENT — PAIN DESCRIPTION - ORIENTATION
ORIENTATION: ANTERIOR;LEFT;RIGHT
ORIENTATION: LEFT;RIGHT;ANTERIOR

## 2024-03-15 NOTE — PROGRESS NOTES
MRI PENDING    Completion of MRI Screening Sheet    Fax to [phone] when completed    Please call [phone]  When this is done    Thank You

## 2024-03-15 NOTE — PROGRESS NOTES
OCCUPATIONAL THERAPY EVALUATION/DISCHARGE  Patient: Juliet Nash (68 y.o. female)  Date: 3/15/2024  Primary Diagnosis: Syncope and collapse [R55]         Precautions:  Fall risk    ASSESSMENT :  Based on the objective data below, the patient is near her independent baseline, currently completing ADLs and transfers at an independent to SBA level. Pt is limited by a decrease in functional mobility, strength, sensation, and activity tolerance. Patient has MS and uses a cane or rollator as needed and L AFO for community mobility but normally not utilizing any device. Recently she has had 4 episodes of syncope/passing out with/without warning symptoms and did hit R head during one fall - denies any symptoms of concussion. She demonstrates cautious movement during all ADLs, concern for falling. Education provided on safety awareness while completing ADL/IADLs, recommending to use a shower chair and completing IADLs seated in a supported chair when appropriate. Pt is only limited by anxiety of syncope and collapse while completing ADLs, otherwise able to complete ADLs independently. Pt BP is soft throughout session. Patient lives alone but has friends/neighbors that are able to assist as needed, recommend some HHOT and assistance upon discharge.        03/15/24 0929 03/15/24 0932 03/15/24 0935   Vital Signs   Pulse 71 72 75   /65 109/70 94/62   MAP (Calculated) 78 83 73   MAP (mmHg) 78 82 73   Patient Position Supine Sitting Standing      03/15/24 0941   Vital Signs   Pulse 100   /63   MAP (Calculated) 75   MAP (mmHg) 75   Patient Position Standing  (after gait)       Functional Outcome Measure:  The patient scored 85/100 on the Barthel Index outcome measure which is indicative of Independent.      Further skilled acute occupational therapy is not indicated at this time.     PLAN :    Recommendation for discharge: (in order for the patient to meet his/her long term goals): Therapy 2 days/week in the home and  any help, physical or verbal, however minor and for whatever reason.  3. The need for supervision renders the patient not independent.  4. A patient's performance should be established using the best available evidence. Asking the patient, friends/relatives and nurses are the usual sources, but direct observation and common sense are also important. However direct testing is not needed.  5. Usually the patient's performance over the preceding 24-48 hours is important, but occasionally longer periods will be relevant.  6. Middle categories imply that the patient supplies over 50 per cent of the effort.  7. Use of aids to be independent is allowed.    Score Interpretation (from Eugenio 1997)    Independent   60-79 Minimally independent   40-59 Partially dependent   20-39 Very dependent   <20 Totally dependent     -Mery Benitez., Barthel, DRobbinW. (1965). Functional evaluation: the Barthel Index. Md State Med J (142.  -MORENITA Becker, CLARK Barreto (1997). The Barthel activities of daily living index: self-reporting versus actual performance in the old (> or = 75 years). Journal of American Geriatric Society 45(7), 832-836.   -DALE MaysF, CARLITO Baeza., Johann, J.A., Villa, A.J. (1999). Measuring the change in disability after inpatient rehabilitation; comparison of the responsiveness of the Barthel Index and Functional Whitfield Measure. Journal of Neurology, Neurosurgery, and Psychiatry, 66(4), 480-484.  -AARON Harris.BOWEN, VALENTINA Rodriguez.BRET, & Gracie, M.A. (2004) Assessment of post-stroke quality of life in cost-effectiveness studies: The usefulness of the Barthel Index and the EuroQoL-5D. Quality of Life Research, 13, 427-43                                                                                                                                                                                                                                 Pain Rating:  Mild headache/10   Pain  Intervention(s):   pain is at a level acceptable to the patient    Activity Tolerance:   Good; c/o of 1-2 second dizziness with positional changes    After treatment:   Patient left in no apparent distress sitting up in chair, Call bell within reach, and Bed/ chair alarm activated    COMMUNICATION/EDUCATION:   The patient's plan of care was discussed with: physical therapist and     Patient Education  Education Given To: Patient  Education Provided: IADL Safety;Role of Therapy;Plan of Care;Orientation;ADL Adaptive Strategies;Transfer Training;Energy Conservation;Fall Prevention Strategies  Education Method: Verbal  Barriers to Learning: None  Education Outcome: Verbalized understanding    Thank you for this referral.  Alice Chandra OT  Minutes: 29    Occupational Therapy Evaluation Charge Determination   History Examination Decision-Making   LOW Complexity : Brief history review  LOW Complexity: 1-3 Performance deficits relating to physical, cognitive, or psychosocial skills that result in activity limitations and/or participation restrictions LOW Complexity: No comorbidities that affect functional and  no verbal  or physical assist needed to complete eval tasks   Based on the above components, the patient evaluation is determined to be of the following complexity level: Low

## 2024-03-15 NOTE — PLAN OF CARE
Poc initiated    Problem: Discharge Planning  Goal: Discharge to home or other facility with appropriate resources  Outcome: Progressing     Problem: Pain  Goal: Verbalizes/displays adequate comfort level or baseline comfort level  Outcome: Progressing     Problem: Safety - Adult  Goal: Free from fall injury  Outcome: Progressing     Problem: ABCDS Injury Assessment  Goal: Absence of physical injury  Outcome: Progressing     Problem: Neurosensory - Adult  Goal: Achieves maximal functionality and self care  Outcome: Progressing     Problem: Cardiovascular - Adult  Goal: Maintains optimal cardiac output and hemodynamic stability  Outcome: Progressing     Problem: Hematologic - Adult  Goal: Maintains hematologic stability  Outcome: Progressing

## 2024-03-15 NOTE — CONSULTS
[facility]  [address] [place] [address]    Neurology Consultation    Date: March 15, 2024    Juliet Nash  [id number]  1955  [address] [place] [id number]    Primary Care Physician:  Hang Goldstein PA-C  [unfilled]  [phone]    Referring Physician:  Valentin Mccormick DO    Impression: This is a patient with a long history of multiple sclerosis whose had half a dozen or so episodes of loss of consciousness over the last few months.  These are not associated with any prodromal symptoms.  She will often find herself coming around 30 minutes later perhaps.  They are not related to posture.  She does not feel lightheaded or dizzy beforehand.  She has no associated cardiac symptoms.  She has had quite a number of bruises related to these episodes.  She has never had any episodes of tongue biting or incontinence.  She is on Wellbutrin 300 mg a day for her bipolar disorder.  She is also on lamotrigine 200 mg at night.  I think there is high likelihood these are likely seizure phenomenon.    Plan and Recommendations: She will speak with her psychiatrist about reducing her Wellbutrin dose to 150 mg a day or eliminating it altogether.  I will add 50 mg lamotrigine to the morning with the idea that she should probably increase this further when she is seen for follow-up.  We will get an MRI of the brain with and without contrast just to make sure there are no significant abnormalities underlying this presumed seizure phenomenon beyond her multiple sclerosis and exposure to Wellbutrin.  We will get an EEG as well but even if it is normal I would still recommend continuing with the increasing dose of lamotrigine.  She is aware that she should not be driving for 6 months and not until released by her physician, and should avoid other potentially dangerous situations which were discussed at length.  She understands and agrees.    Barrier to  3/15/2024 9:06:06 AM      Troponin, High Sensitivity 03/14/2024 5  0 - 51 ng/L Final    Troponin, High Sensitivity 03/14/2024 5  0 - 51 ng/L Final    IVSd 03/15/2024 1.0 (A)  0.6 - 0.9 cm Final    LVIDd 03/15/2024 4.1  3.9 - 5.3 cm Final    LVIDs 03/15/2024 2.8  cm Final    LVOT Diameter 03/15/2024 2.0  cm Final    LVPWd 03/15/2024 1.0 (A)  0.6 - 0.9 cm Final    LVOT Peak Gradient 03/15/2024 10  mmHg Final    LVOT Peak Velocity 03/15/2024 1.6  m/s Final    LA Diameter 03/15/2024 3.4  cm Final    AV Area by Peak Velocity 03/15/2024 3.1  cm2 Final    AV Peak Gradient 03/15/2024 11  mmHg Final    AV Peak Velocity 03/15/2024 1.6  m/s Final    TR Peak Gradient 03/15/2024 19  mmHg Final    TR Max Velocity 03/15/2024 2.16  m/s Final    Aortic Root 03/15/2024 3.2  cm Final    Body Surface Area 03/15/2024 1.87  m2 Final    Fractional Shortening 2D 03/15/2024 32  28 - 44 % Final    LVIDd Index 03/15/2024 2.24  cm/m2 Final    LVIDs Index 03/15/2024 1.53  cm/m2 Final    LV RWT Ratio 03/15/2024 0.49   Final    LV Mass 2D 03/15/2024 132.1  67 - 162 g Final    LV Mass 2D Index 03/15/2024 72.2  43 - 95 g/m2 Final    LVOT Area 03/15/2024 3.1  cm2 Final    LA Size Index 03/15/2024 1.86  cm/m2 Final    LA/AO Root Ratio 03/15/2024 1.06   Final    Ao Root Index 03/15/2024 1.75  cm/m2 Final    AV Velocity Ratio 03/15/2024 1.00   Final    RON/BSA Peak Velocity 03/15/2024 1.7  cm2/m2 Final    Sodium 03/15/2024 139  136 - 145 mmol/L Final    Potassium 03/15/2024 4.5  3.5 - 5.1 mmol/L Final    Chloride 03/15/2024 112 (H)  97 - 108 mmol/L Final    CO2 03/15/2024 27  21 - 32 mmol/L Final    Anion Gap 03/15/2024 0 (L)  5 - 15 mmol/L Final    Glucose 03/15/2024 92  65 - 100 mg/dL Final    BUN 03/15/2024 10  6 - 20 MG/DL Final    Creatinine 03/15/2024 0.56  0.55 - 1.02 MG/DL Final    Bun/Cre Ratio 03/15/2024 18  12 - 20   Final    Est, Glom Filt Rate 03/15/2024 >60  >60 ml/min/1.73m2 Final    Calcium 03/15/2024 9.4  8.5 - 10.1 MG/DL Final    WBC

## 2024-03-15 NOTE — PLAN OF CARE
Problem: Physical Therapy - Adult  Goal: By Discharge: Performs mobility at highest level of function for planned discharge setting.  See evaluation for individualized goals.  Description: FUNCTIONAL STATUS PRIOR TO ADMISSION: Patient was modified independent using a single point cane, as needed, for functional mobility. With MS exacerbations she will use cane or rollator as needed. Wears L AFO for foot drop for community mobility. Has had recent falls due to syncope including hitting R face, no falls due to balance.    HOME SUPPORT PRIOR TO ADMISSION: The patient lived alone with friends across the street to provide assistance. They check on her daily, assist as needed (primarily for carrying in heavy items), and are retired.    Physical Therapy Goals  Initiated 3/15/2024  1.  Patient will move from supine to sit and sit to supine, scoot up and down, and roll side to side in bed with independence within 7 day(s).    2.  Patient will perform sit to stand with modified independence within 7 day(s).  3.  Patient will transfer from bed to chair and chair to bed with modified independence using the least restrictive device within 7 day(s).  4.  Patient will ambulate with modified independence for 200 feet with the least restrictive device within 7 day(s).   5.  Patient will ascend/descend 12 stairs with handrail(s) with modified independence within 7 day(s).  Outcome: Progressing     PHYSICAL THERAPY EVALUATION    Patient: Juliet Nash (68 y.o. female)  Date: 3/15/2024  Primary Diagnosis: Syncope and collapse [R55]       Precautions:                  Fall risk  ASSESSMENT :   DEFICITS/IMPAIRMENTS:   The patient is limited by decreased functional mobility, independence in ADLs, high-level IADLs, strength, sensation, activity tolerance, endurance, balance, increased pain levels (mild headache that decreased with time out of bed). Patient has MS and uses a cane or rollator as needed and L AFO for community mobility but  need of SNF/IPR.    1. Medina Schwartz, Delio Harmon Sandra D. Passek, Alvarez Lozano, Arnaldo Schwartz.  Validity of the AM-PAC “6-Clicks” Inpatient Daily Activity and Basic Mobility Short Forms. Physical Therapy Mar 2014, [phone]; DOI: 10.2522/ptj.64368120  2. Claudio QUEEN, Ketan RENEE, Ghislaine RENEE, Chelsey RENEE. Association of AM-PAC \"6-Clicks\" Basic Mobility and Daily Activity Scores With Discharge Destination. Phys Ther. 2021 Apr 4;101(4):vdzk134. doi: 10.1093/ptj/rqop143. PMID: 58296201.  3. Talia RENEE, Case CHAPA, Celsa S, Sara K, Arcelia S. Activity Measure for Post-Acute Care \"6-Clicks\" Basic Mobility Scores Predict Discharge Destination After Acute Care Hospitalization in Select Patient Groups: A Retrospective, Observational Study. Arch Rehabil Res Clin Transl. 2022 Jul 16;4(3):297689. doi: 10.1016/j.arrct.2022.017327. PMID: 38564236; PMCID: [id number].  4. Lana WISEMAN, Patsy S, Kristen W, Elyssa P. AM-PAC Short Forms Manual 4.0. Revised 2/2020.                                                                                                                                                                                                                               Pain Rating:  Mild headache, improves with time up    Pain Intervention(s):   rest    Activity Tolerance:   Good     03/15/24 0929 03/15/24 0932 03/15/24 0935   Vitals   Pulse 71 72 75   /65 109/70 94/62   MAP (Calculated) 78 83 73   MAP (mmHg) 78 82 73   Patient Position Supine Sitting Standing      03/15/24 0941   Vitals   Pulse 100   /63   MAP (Calculated) 75   MAP (mmHg) 75   Patient Position Standing  (after gait)     After treatment:   Patient left in no apparent distress sitting up in chair, Call bell within reach, and Bed/ chair alarm activated    COMMUNICATION/EDUCATION:   The patient's plan of care was discussed with: occupational therapist, registered nurse, and certified nursing assistant/patient care  technician    Patient Education  Education Given To: Patient  Education Provided: Role of Therapy;Plan of Care;Equipment;Fall Prevention Strategies  Education Method: Verbal;Demonstration  Barriers to Learning: None  Education Outcome: Verbalized understanding;Demonstrated understanding    Thank you for this referral.  Perlita Garcia, PT  Minutes: 28

## 2024-03-15 NOTE — CONSULTS
Sexually Abused: No   Housing Stability: Low Risk  (3/14/2024)    Housing Stability Vital Sign     Unable to Pay for Housing in the Last Year: No     Number of Places Lived in the Last Year: 1     Unstable Housing in the Last Year: No     No Known Allergies   Family History   Problem Relation Age of Onset    Cancer Mother         Cervical, Lung and Breast    Heart Disease Father     Breast Cancer Mother       Current Facility-Administered Medications   Medication Dose Route Frequency    lamoTRIgine (LAMICTAL) tablet 50 mg  50 mg Oral QAM    albuterol (PROVENTIL) (2.5 MG/3ML) 0.083% nebulizer solution 2.5 mg  2.5 mg Nebulization Q4H PRN    aspirin EC tablet 81 mg  81 mg Oral Daily    atorvastatin (LIPITOR) tablet 20 mg  20 mg Oral Daily    baclofen (LIORESAL) tablet 10 mg  10 mg Oral BID    buPROPion (WELLBUTRIN XL) extended release tablet 300 mg  300 mg Oral QAM    citalopram (CELEXA) tablet 30 mg  30 mg Oral Daily    fluticasone (FLONASE) 50 MCG/ACT nasal spray 2 spray  2 spray Each Nostril Daily    hydrOXYzine HCl (ATARAX) tablet 10 mg  10 mg Oral Nightly PRN    lamoTRIgine (LAMICTAL) tablet 200 mg  200 mg Oral QHS    Teriflunomide TABS 1 tablet (Patient Supplied)  1 tablet Oral Daily    sodium chloride flush 0.9 % injection 5-40 mL  5-40 mL IntraVENous 2 times per day    sodium chloride flush 0.9 % injection 5-40 mL  5-40 mL IntraVENous PRN    0.9 % sodium chloride infusion   IntraVENous PRN    enoxaparin (LOVENOX) injection 40 mg  40 mg SubCUTAneous Daily    ondansetron (ZOFRAN-ODT) disintegrating tablet 4 mg  4 mg Oral Q8H PRN    Or    ondansetron (ZOFRAN) injection 4 mg  4 mg IntraVENous Q6H PRN    polyethylene glycol (GLYCOLAX) packet 17 g  17 g Oral Daily PRN    acetaminophen (TYLENOL) tablet 650 mg  650 mg Oral Q6H PRN    Or    acetaminophen (TYLENOL) suppository 650 mg  650 mg Rectal Q6H PRN    **Can pt supply own Teriflunomide TABS 1 tablet?  Please send message to RX to ID and Label**   Other Daily         Cardiovascular ROS: positive for - loss of consciousness         Objective:      Vitals:    03/15/24 0941   BP: 100/63   Pulse: 100   Resp:    Temp:    SpO2:        Physical Exam  Constitutional:       General: She is not in acute distress.  Neck:      Vascular: No JVD.   Cardiovascular:      Rate and Rhythm: Normal rate and regular rhythm.   Pulmonary:      Effort: Pulmonary effort is normal.      Breath sounds: No wheezing.   Musculoskeletal:      Cervical back: Neck supple.   Skin:     General: Skin is warm and dry.   Neurological:      Mental Status: She is alert.          Data Review:   Recent Labs     03/14/24  1939 03/15/24  0551   WBC 6.9 6.1   HGB 13.1 12.8   HCT 40.9 40.6    194     Recent Labs     03/14/24  1939 03/15/24  0551    139   K 4.1 4.5    112*   CO2 31 27   BUN 14 10   CREATININE 0.71 0.56   ALT 33  --        Recent Labs     03/14/24 1939 03/14/24  2309   TROPHS 5 5         Intake/Output Summary (Last 24 hours) at 3/15/2024 2023  Last data filed at 3/14/2024 2353  Gross per 24 hour   Intake --   Output 450 ml   Net -450 ml        Cardiographics    Telemetry: Sinus rhythm  ECG: Rhythm    Echocardiogram:  03/14/24    ECHO (TTE) LIMITED (PRN CONTRAST/BUBBLE/STRAIN/3D) 03/15/2024 11:57 AM (Final)    Interpretation Summary    Left Ventricle: Normal left ventricular systolic function with a visually estimated EF of 60 - 65%. Left ventricle size is normal. Increased wall thickness. Normal wall motion. Normal diastolic function.    Right Ventricle: Right ventricle size is normal. Normal systolic function.    Mitral Valve: Mild regurgitation.    Image quality is good.    Signed by: Toyin Velasquez MD on 3/15/2024 11:57 AM    CXRAY: No acute process       Assessment:       Principal Problem:    Syncope and collapse  Active Problems:    Acute alteration in mental status    Convulsive syncope  Resolved Problems:    * No resolved hospital problems. *       Plan:     Ms. Nash is a

## 2024-03-15 NOTE — PROGRESS NOTES
End of Shift Note    Bedside shift change report given to SABA Moraes(oncoming nurse) by SABA Alexandra  Report included the following information       Shift worked:  Days   Shift summary and any significant changes:    PT/OT came to see.  Echo completed.  MRI screening complete.  Need EEG results, MRI results.       Concerns for physician to address:     Zone phone for oncoming shift:        Patient Information  Juliet Nash  68 y.o.  3/14/2024  7:23 PM by Vaelntin Mccormick DO. Juliet Nash was admitted from [facility]    Problem List  Patient Active Problem List    Diagnosis Date Noted    Syncope and collapse 03/14/2024    Atherosclerosis of native coronary artery of native heart with angina pectoris (HCC)     Bipolar 2 disorder (HCC) 08/22/2022    Mild intermittent asthma without complication 03/04/2021    Hyperlipidemia 03/04/2021    Migraine headache 03/04/2021    GERD (gastroesophageal reflux disease) 10/05/2020    Overweight 09/23/2020    Family history of breast cancer 02/19/2020    Family history of cervical cancer 12/18/2018    Recurrent major depressive disorder, in partial remission (HCC) 12/02/2018    IBS (irritable bowel syndrome) 10/23/2017    Depression 10/23/2017    Anxiety 10/23/2017    Multiple sclerosis, relapsing-remitting (HCC) 10/23/2017     Past Medical History:   Diagnosis Date    Asthma in adult, mild intermittent, uncomplicated     Depression     GERD (gastroesophageal reflux disease)     Hyperlipemia     IBS (irritable bowel syndrome)     Major depressive disorder, recurrent episode, in partial remission (HCC)     Migraine headache     Multiple sclerosis, relapsing-remitting (HCC)     Overweight        Core Measures:  CVA: No:yes      Activity:     Number times ambulated in hallways past shift: 0  Number of times OOB to chair past shift:   Cardiac:   Cardiac Monitoring: y  y    Access:   Current line(s):PIV    Genitourinary:   Urinary status: voiding continent      Respiratory:   O2 Device:  None (Room air)          GI:  Last BM (including prior to admit):  (3/14/2024)  Current diet:  ADULT DIET; Regular; Low Fat/Low Chol/High Fiber/ROBERTO    Tolerating current diet: y       Pain Management:   Patient states pain is manageable on current regimen: n/a    Skin:  Teo Scale Score: 20  Interventions: {    Patient Safety:  Fall Score: Hurst Total Score: 85  Interventions: bed/chair alarm     DVT prophylaxis:  DVT prophylaxis Med-lovenox      Wounds: (If Applicable)  Wounds - Bruises to face and limbs      Active Consults:  IP CONSULT TO CARDIOLOGY  IP CONSULT TO NEUROLOGY  IP CONSULT TO CASE MANAGEMENT    Length of Stay:  Expected LOS: 1  Actual LOS: 1  Discharge Plan:home when testing completed

## 2024-03-15 NOTE — PROGRESS NOTES
Hospitalist Progress Note    NAME:   Juliet Nash   : 1955   MRN: [id number]     Date/Time: 3/15/2024 4:16 PM  Patient PCP: Hang Goldstein PA-C    Estimated discharge date:  Barriers: Brain MRI      Assessment / Plan:  Recurrent syncope  Essential hypertension  Hyperlipidemia  Brain MRI   Cardiology consulted,  Neurology consulted     Multiple sclerosis  Continue PTA teriflunomide  Continue PTA baclofen     Asthma  Albuterol nebulizer as needed     MDD/PASTORA/mood disorder  Insomnia  Continue PTA Celexa  Continue PTA Lamictal  Continue PTA hydroxyzine nightly as needed            Medical Decision Making:   I personally reviewed labs:CBC , BMP   I personally reviewed imaging:CT head   I personally reviewed EKG:yes   Toxic drug monitoring:   Discussed case with: nurse and IDR         Code Status: Full   DVT Prophylaxis: Lovenox   GI Prophylaxis:    Subjective:     Chief Complaint / Reason for Physician Visit  \"\".  Discussed with RN events overnight.       Objective:     VITALS:   Last 24hrs VS reviewed since prior progress note. Most recent are:  Patient Vitals for the past 24 hrs:   BP Temp Temp src Pulse Resp SpO2 Height Weight   03/15/24 0941 100/63 -- -- 100 -- -- -- --   03/15/24 0935 94/62 -- -- 75 -- -- -- --   03/15/24 0932 109/70 -- -- 72 -- -- -- --   03/15/24 0929 105/65 -- -- 71 -- 97 % -- --   03/15/24 0900 97/67 97.9 °F (36.6 °C) Oral 73 20 96 % -- --   03/15/24 0820 110/69 -- -- -- -- -- 1.626 m (5' 4.02\") 77.1 kg (170 lb)   245 110/ 97.3 °F (36.3 °C) Oral 71 -- 99 % -- --   24 113/69 -- -- 72 24 97 % -- --   24 109/74 -- -- 66 17 95 % -- --   24 193 109/63 98.3 °F (36.8 °C) Oral 73 20 94 % 1.626 m (5' 4\") 77.1 kg (170 lb)         Intake/Output Summary (Last 24 hours) at 3/15/2024 1616  Last data filed at 3/14/2024 2353  Gross per 24 hour   Intake --   Output 450 ml   Net -450 ml        I had a face to face encounter and independently  examined this patient on 3/15/2024, as outlined below:  PHYSICAL EXAM:  General: Alert, cooperative  EENT:  EOMI. Anicteric sclerae.  Resp:  CTA bilaterally, no wheezing or rales.  No accessory muscle use  CV:  Regular  rhythm,  No edema  GI:  Soft, Non distended, Non tender.  +Bowel sounds  Neurologic:  Alert and oriented X 3, normal speech,   Psych:   Good insight. Not anxious nor agitated  Skin:  No rashes.  No jaundice    Reviewed most current lab test results and cultures  YES  Reviewed most current radiology test results   YES  Review and summation of old records today    NO  Reviewed patient's current orders and MAR    YES  PMH/SH reviewed - no change compared to H&P    Procedures: see electronic medical records for all procedures/Xrays and details which were not copied into this note but were reviewed prior to creation of Plan.      LABS:  I reviewed today's most current labs and imaging studies.  Pertinent labs include:  Recent Labs     03/14/24  1939 03/15/24  0551   WBC 6.9 6.1   HGB 13.1 12.8   HCT 40.9 40.6    194     Recent Labs     03/14/24  1939 03/15/24  0551    139   K 4.1 4.5    112*   CO2 31 27   GLUCOSE 92 92   BUN 14 10   CREATININE 0.71 0.56   CALCIUM 10.0 9.4   LABALBU 3.8  --    BILITOT 0.3  --    AST 19  --    ALT 33  --        Signed: James Lubin MD

## 2024-03-15 NOTE — PROGRESS NOTES
End of Shift Note    Bedside shift change report given to SABA Alexandra(oncoming nurse) by SABA Villasenor  Report included the following information       Shift worked: nuights   Shift summary and any significant changes:     New admit.  Syncope with collaspe, frequent falls, \"blacking out\" neuro and Cardio consults pending, Echo pending, no syncopal episodes witnessed this shift.  Urine specimen collected and sent to lab, no infection detected, no acute events over night.       Concerns for physician to address:     Zone phone for oncoming shift:   [phone]     Patient Information  Juliet Nash  68 y.o.  3/14/2024  7:23 PM by Valentin Mccormick DO. Juliet Nash was admitted from [facility]    Problem List  Patient Active Problem List    Diagnosis Date Noted    Syncope and collapse 03/14/2024    Atherosclerosis of native coronary artery of native heart with angina pectoris (HCC)     Bipolar 2 disorder (HCC) 08/22/2022    Mild intermittent asthma without complication 03/04/2021    Hyperlipidemia 03/04/2021    Migraine headache 03/04/2021    GERD (gastroesophageal reflux disease) 10/05/2020    Overweight 09/23/2020    Family history of breast cancer 02/19/2020    Family history of cervical cancer 12/18/2018    Recurrent major depressive disorder, in partial remission (HCC) 12/02/2018    IBS (irritable bowel syndrome) 10/23/2017    Depression 10/23/2017    Anxiety 10/23/2017    Multiple sclerosis, relapsing-remitting (HCC) 10/23/2017     Past Medical History:   Diagnosis Date    Asthma in adult, mild intermittent, uncomplicated     Depression     GERD (gastroesophageal reflux disease)     Hyperlipemia     IBS (irritable bowel syndrome)     Major depressive disorder, recurrent episode, in partial remission (HCC)     Migraine headache     Multiple sclerosis, relapsing-remitting (HCC)     Overweight        Core Measures:  N/a      Activity:     Number times ambulated in hallways past shift: 0  Number of times OOB to chair past shift:  0  Cardiac:   Cardiac Monitoring: y  y    Access:   Current line(s):PIV    Genitourinary:   Urinary status: voiding continent      Respiratory:   O2 Device: None (Room air)          GI:  Last BM (including prior to admit): 03/14/24  Current diet:  ADULT DIET; Regular; Low Fat/Low Chol/High Fiber/ROBERTO    Tolerating current diet: y       Pain Management:   Patient states pain is manageable on current regimen: n/a    Skin:  Teo Scale Score: 20  Interventions: {    Patient Safety:  Fall Score: Hurst Total Score: 85  Interventions: bed/chair alarm     @Rollbelt  @dexterity to release roll belt  Yes/No ( must document dexterity  here by stating Yes or No here, otherwise this is a restraint and must follow restraint documentation policy.)    DVT prophylaxis:  DVT prophylaxis Med-lovenox      Wounds: (If Applicable)  Wounds-n/a      Active Consults:  IP CONSULT TO CARDIOLOGY  IP CONSULT TO NEUROLOGY    Length of Stay:  Expected LOS: 2  Actual LOS: 1  Discharge Plan:home when testing completed

## 2024-03-15 NOTE — CARE COORDINATION
Discharge Planning   Type of Residence Apartment   Living Arrangements Alone   Current Services Prior To Admission None   Potential Assistance Needed N/A   DME Ordered? No   Potential Assistance Purchasing Medications No   Type of Home Care Services PT;OT   Patient expects to be discharged to: Apartment   Services At/After Discharge   Transition of Care Consult (CM Consult) Home Health   Services At/After Discharge Home Health;PT;OT    Resource Information Provided? No   Mode of Transport at Discharge Other (see comment)  (Friend to transport)   Confirm Follow Up Transport Self   Condition of Participation: Discharge Planning   The Plan for Transition of Care is related to the following treatment goals: Home with HH to continue working toward strenght and mobility goals   The Patient and/or Patient Representative was provided with a Choice of Provider? Patient   The Patient and/Or Patient Representative agree with the Discharge Plan? Yes   Freedom of Choice list was provided with basic dialogue that supports the patient's individualized plan of care/goals, treatment preferences, and shares the quality data associated with the providers?  Yes     Advance Care Planning     General Advance Care Planning (ACP) Conversation    Date of Conversation: 3/15/2024  Conducted with: Patient with Decision Making Capacity    Healthcare Decision Maker:    Primary Decision Maker: Chase Pang - Child - [phone]    Secondary Decision Maker: Aguilar Pang - Child - [phone]  Click here to complete Healthcare Decision Makers including selection of the Healthcare Decision Maker Relationship (ie \"Primary\").   Today we documented Decision Maker(s) consistent with Legal Next of Kin hierarchy.    Content/Action Overview:  DECLINED ACP Conversation - will revisit periodically  Reviewed DNR/DNI and patient elects Full Code (Attempt Resuscitation)      Length of Voluntary ACP Conversation in minutes:  <16 minutes  (Non-Billable)    Flora Marino, MAYCOL Bower  Care Management  [facility]  [phone]

## 2024-03-15 NOTE — H&P
Hospitalist Admission Note    NAME:  Juliet Nash   :  1955   MRN:  [id number]     Date/Time:  3/14/2024 11:52 PM    Patient PCP: Hang Goldstein PA-C    ______________________________________________________________________  Given the patient's current clinical presentation, I have a high level of concern for decompensation if discharged from the emergency department.  Complex decision making was performed, which includes reviewing the patient's available past medical records, laboratory results, and x-ray films.       My assessment of this patient's clinical condition and my plan of care is as follows.    Assessment / Plan:    Active Problems:  Recurrent syncope  Essential hypertension  Hyperlipidemia  Multiple sclerosis  Asthma  MDD/PASTORA/mood disorder  Insomnia    Plan:  Recurrent syncope  Essential hypertension  Hyperlipidemia  Admit to telemetry monitoring  Check orthostatics  1 L normal saline overnight for volume resuscitation  Obtain TTE  Cardiology consulted, greatly appreciate their expertise  Neurology consulted, greatly appreciate their expertise    Multiple sclerosis  Continue PTA teriflunomide  Continue PTA baclofen    Asthma  Albuterol nebulizer as needed    MDD/PASTORA/mood disorder  Insomnia  Continue PTA Celexa  Continue PTA Lamictal  Continue PTA hydroxyzine nightly as needed    Medical Decision Making:   I personally reviewed labs: Yes, as listed below  I personally reviewed imaging: CT head, CT C-spine, CXR  Toxic drug monitoring: None  Discussed case with: ED provider. After discussion I am in agreement that acuity of patient's medical condition necessitates hospital stay.      Code Status: Full  DVT Prophylaxis: Lovenox  GI Prophylaxis: Not indicated  Baseline: Independent    Subjective:   CHIEF COMPLAINT: Referral-recurrent falls/syncope    HISTORY OF PRESENT ILLNESS:     Juliet Nash is a 68 y.o.  female with PMHx as listed below presenting to the emergency department with  Weight   24 2315 110/69 97.3 °F (36.3 °C) Oral 71 -- 99 % -- --   24 2302 113/69 -- -- 72 24 97 % -- --   24 109/74 -- -- 66 17 95 % -- --   24 109/63 98.3 °F (36.8 °C) Oral 73 20 94 % 1.626 m (5' 4\") 77.1 kg (170 lb)       Temp (24hrs), Av.8 °F (36.6 °C), Min:97.3 °F (36.3 °C), Max:98.3 °F (36.8 °C)           Wt Readings from Last 12 Encounters:   24 77.1 kg (170 lb)   23 76.1 kg (167 lb 12.3 oz)   09/15/23 76.6 kg (168 lb 12.8 oz)   03/15/23 75.4 kg (166 lb 3.2 oz)   22 71.2 kg (157 lb)   22 70.9 kg (156 lb 6.4 oz)   22 69 kg (152 lb 3.2 oz)         PHYSICAL EXAM:  General:    Alert, cooperative, elderly  female in no apparent distress        HEENT:  Resolving ecchymosis to right face, anicteric sclerae, pink conjunctivae, mucosa moist, dentition fair     Neck:   Supple, symmetrical, trachea midline     Lungs:   CTAB. Symmetric expansion. Good aeration. Normal respiratory effort.     Chest wall:   No tenderness. No Accessory muscle use.     Cardiovascular:   Regular rate rhythm, no murmur rub gallop, no JVD. Radial/AT/DP pulses 2+     GI/:    Soft, non-tender. Not distended.  Bowel sounds normal. No HSM/Masses      Extremities  No edema. No cyanosis.  Mild contusion to right shin without palpable deformity tolerating weightbearing well     Skin:  No significant lesions/ jaundice/ rashes noted.       Neurologic:  PERRL. EOMI. No facial asymmetry. No aphasia. No slurred speech. Moves all extremities. No overt focal deficits appreciated     Psych:   Normal affect. Good insight. Cooperative     Other:    N/A             LAB DATA REVIEWED:    Recent Results (from the past 12 hour(s))   EKG 12 Lead    Collection Time: 24  7:29 PM   Result Value Ref Range    Ventricular Rate 73 BPM    Atrial Rate 73 BPM    P-R Interval 206 ms    QRS Duration 94 ms    Q-T Interval 400 ms    QTc Calculation (Bazett) 440 ms    P Axis 74 degrees    R Axis 63

## 2024-03-15 NOTE — PLAN OF CARE
Problem: Discharge Planning  Goal: Discharge to home or other facility with appropriate resources  3/15/2024 1136 by Nasrin Chawla RN  Outcome: Progressing  3/15/2024 0051 by Shayna Solis RN  Outcome: Progressing     Problem: Pain  Goal: Verbalizes/displays adequate comfort level or baseline comfort level  3/15/2024 1136 by Nasrin Chawla RN  Outcome: Progressing  3/15/2024 0051 by Shayna Solis RN  Outcome: Progressing     Problem: Safety - Adult  Goal: Free from fall injury  3/15/2024 1136 by Nasrin Chawla RN  Outcome: Progressing  3/15/2024 0051 by Shayna Solis RN  Outcome: Progressing     Problem: ABCDS Injury Assessment  Goal: Absence of physical injury  3/15/2024 1136 by Nasrin Chawla RN  Outcome: Progressing  3/15/2024 0051 by Shayna Solis RN  Outcome: Progressing     Problem: Neurosensory - Adult  Goal: Achieves maximal functionality and self care  3/15/2024 1136 by Nasrin Chawla RN  Outcome: Progressing  3/15/2024 0051 by Shayna Solis RN  Outcome: Progressing     Problem: Cardiovascular - Adult  Goal: Maintains optimal cardiac output and hemodynamic stability  3/15/2024 1136 by Nasrin Chawla RN  Outcome: Progressing  3/15/2024 0051 by Shayna Solis RN  Outcome: Progressing     Problem: Hematologic - Adult  Goal: Maintains hematologic stability  3/15/2024 1136 by Nasrin Chawla RN  Outcome: Progressing  3/15/2024 0051 by Shayna Solis RN  Outcome: Progressing     Problem: Physical Therapy - Adult  Goal: By Discharge: Performs mobility at highest level of function for planned discharge setting.  See evaluation for individualized goals.  Description: FUNCTIONAL STATUS PRIOR TO ADMISSION: Patient was modified independent using a single point cane, as needed, for functional mobility. With MS exacerbations she will use cane or rollator as needed. Wears L AFO for foot drop for community mobility. Has had recent falls due to syncope including hitting R face, no falls due to balance.    HOME  SUPPORT PRIOR TO ADMISSION: The patient lived alone with friends across the street to provide assistance. They check on her daily, assist as needed (primarily for carrying in heavy items), and are retired.    Physical Therapy Goals  Initiated 3/15/2024  1.  Patient will move from supine to sit and sit to supine, scoot up and down, and roll side to side in bed with independence within 7 day(s).    2.  Patient will perform sit to stand with modified independence within 7 day(s).  3.  Patient will transfer from bed to chair and chair to bed with modified independence using the least restrictive device within 7 day(s).  4.  Patient will ambulate with modified independence for 200 feet with the least restrictive device within 7 day(s).   5.  Patient will ascend/descend 12 stairs with handrail(s) with modified independence within 7 day(s).  3/15/2024 1131 by Perlita Garcia, PT  Outcome: Progressing

## 2024-03-15 NOTE — ED PROVIDER NOTES
[facility] NEUROSCIENCE TELEMETRY  EMERGENCY DEPARTMENT ENCOUNTER       Pt Name: Juliet Nash  MRN: [id number]  Birthdate 1955  Date of evaluation: 3/14/2024  Provider: Kimmie Naranjo DO   PCP: Hang Goldstein PA-C  Note Started: 12:20 AM 3/15/24     CHIEF COMPLAINT       Chief Complaint   Patient presents with    Loss of Consciousness     Pt presents with c/o multiple syncopal episodes over last few months. Pt reports her syncopal episodes normally have chest pain as well. Pt denies dizziness. Most resent episode Friday that resulted in a ground level fall.         HISTORY OF PRESENT ILLNESS: 1 or more elements      History From: Patient  None     Juliet Nash is a 68 y.o. female who presents with chief complaint of multiple syncopal episodes.  Patient states has been ongoing for the past few months, she has had at least 4 episodes of syncope.  She states there are episodes of either blacking out or dozing off, she states she is out for about 30 minutes at a time.  She denies any known seizure.  She denies any dizziness.  She states she has had episodes of having chest pain prior to syncopal episode.  She denies any known cardiac history.  She has not worn a Holter monitor recently.  She has a remote history of wearing a Holter monitor but is unsure what exactly this was for.  She denies any dizziness.  Her most recent episode was on Friday and she hit her face on the floor.     Nursing Notes were all reviewed and agreed with or any disagreements were addressed in the HPI.       PAST HISTORY     Past Medical History:  Past Medical History:   Diagnosis Date    Asthma in adult, mild intermittent, uncomplicated     Depression     GERD (gastroesophageal reflux disease)     Hyperlipemia     IBS (irritable bowel syndrome)     Major depressive disorder, recurrent episode, in partial remission (HCC)     Migraine headache     Multiple sclerosis, relapsing-remitting (HCC)     Overweight          Past Surgical  Temp Source Oral      SpO2 94 %      Weight - Scale 77.1 kg (170 lb)      Height 1.626 m (5' 4\")      Head Circumference       Peak Flow       Pain Score       Pain Loc       Pain Edu?       Excl. in GC?               Physical Exam  Vitals and nursing note reviewed.   Constitutional:       Appearance: Normal appearance.   HENT:      Head: Normocephalic and atraumatic.        Comments: Bruising over the right side of the face is well-healing.  Nontender to palpation.     Mouth/Throat:      Mouth: Mucous membranes are moist.   Cardiovascular:      Rate and Rhythm: Normal rate.   Pulmonary:      Effort: Pulmonary effort is normal. No respiratory distress.      Breath sounds: Normal breath sounds.   Abdominal:      General: Abdomen is flat.      Palpations: Abdomen is soft.   Musculoskeletal:         General: Normal range of motion.   Skin:     General: Skin is warm.   Neurological:      Mental Status: She is alert. Mental status is at baseline.            DIAGNOSTIC RESULTS   LABS:     Recent Results (from the past 48 hour(s))   EKG 12 Lead    Collection Time: 03/14/24  7:29 PM   Result Value Ref Range    Ventricular Rate 73 BPM    Atrial Rate 73 BPM    P-R Interval 206 ms    QRS Duration 94 ms    Q-T Interval 400 ms    QTc Calculation (Bazett) 440 ms    P Axis 74 degrees    R Axis 63 degrees    T Axis 41 degrees    Diagnosis       Normal sinus rhythm  Normal ECG  When compared with ECG of 05-OCT-2022 09:59,  No significant change was found     CBC with Auto Differential    Collection Time: 03/14/24  7:39 PM   Result Value Ref Range    WBC 6.9 3.6 - 11.0 K/uL    RBC 4.29 3.80 - 5.20 M/uL    Hemoglobin 13.1 11.5 - 16.0 g/dL    Hematocrit 40.9 35.0 - 47.0 %    MCV 95.3 80.0 - 99.0 FL    MCH 30.5 26.0 - 34.0 PG    MCHC 32.0 30.0 - 36.5 g/dL    RDW 12.4 11.5 - 14.5 %    Platelets 215 150 - 400 K/uL    MPV 8.9 8.9 - 12.9 FL    Nucleated RBCs 0.0 0  WBC    nRBC 0.00 0.00 - 0.01 K/uL    Neutrophils % 39 32 - 75 %

## 2024-03-16 ENCOUNTER — TELEPHONE (OUTPATIENT)
Facility: HOSPITAL | Age: 69
End: 2024-03-16

## 2024-03-16 VITALS
BODY MASS INDEX: 29.02 KG/M2 | HEART RATE: 80 BPM | WEIGHT: 170 LBS | TEMPERATURE: 98.1 F | HEIGHT: 64 IN | OXYGEN SATURATION: 95 % | RESPIRATION RATE: 18 BRPM | SYSTOLIC BLOOD PRESSURE: 107 MMHG | DIASTOLIC BLOOD PRESSURE: 67 MMHG

## 2024-03-16 PROCEDURE — 2580000003 HC RX 258: Performed by: STUDENT IN AN ORGANIZED HEALTH CARE EDUCATION/TRAINING PROGRAM

## 2024-03-16 PROCEDURE — 6370000000 HC RX 637 (ALT 250 FOR IP): Performed by: PSYCHIATRY & NEUROLOGY

## 2024-03-16 PROCEDURE — 6370000000 HC RX 637 (ALT 250 FOR IP): Performed by: STUDENT IN AN ORGANIZED HEALTH CARE EDUCATION/TRAINING PROGRAM

## 2024-03-16 PROCEDURE — 6360000002 HC RX W HCPCS: Performed by: STUDENT IN AN ORGANIZED HEALTH CARE EDUCATION/TRAINING PROGRAM

## 2024-03-16 RX ORDER — LAMOTRIGINE 25 MG/1
50 TABLET ORAL EVERY MORNING
Qty: 30 TABLET | Refills: 3 | Status: SHIPPED | OUTPATIENT
Start: 2024-03-17

## 2024-03-16 RX ADMIN — LAMOTRIGINE 50 MG: 25 TABLET ORAL at 08:45

## 2024-03-16 RX ADMIN — CITALOPRAM HYDROBROMIDE 30 MG: 20 TABLET ORAL at 08:45

## 2024-03-16 RX ADMIN — BACLOFEN 10 MG: 10 TABLET ORAL at 08:45

## 2024-03-16 RX ADMIN — ENOXAPARIN SODIUM 40 MG: 100 INJECTION SUBCUTANEOUS at 08:44

## 2024-03-16 RX ADMIN — FLUTICASONE PROPIONATE 2 SPRAY: 50 SPRAY, METERED NASAL at 08:50

## 2024-03-16 RX ADMIN — SODIUM CHLORIDE, PRESERVATIVE FREE 10 ML: 5 INJECTION INTRAVENOUS at 08:45

## 2024-03-16 RX ADMIN — ASPIRIN 81 MG: 81 TABLET, COATED ORAL at 08:45

## 2024-03-16 RX ADMIN — BUPROPION HYDROCHLORIDE 300 MG: 150 TABLET, EXTENDED RELEASE ORAL at 08:45

## 2024-03-16 RX ADMIN — TERIFLUNOMIDE 1 TABLET: 14 TABLET, FILM COATED ORAL at 08:46

## 2024-03-16 RX ADMIN — ATORVASTATIN CALCIUM 20 MG: 20 TABLET, FILM COATED ORAL at 08:45

## 2024-03-16 ASSESSMENT — PAIN SCALES - GENERAL: PAINLEVEL_OUTOF10: 0

## 2024-03-16 NOTE — DISCHARGE SUMMARY
Discharge Summary    Name: Juliet Nash  [id number]  YOB: 1955 (Age: 68 y.o.)   Date of Admission: 3/14/2024  Date of Discharge: 3/16/2024  Attending Physician: James Lubin MD    Discharge Diagnosis:   Recurrent syncope  Essential hypertension  Hyperlipidemia  Multiple sclerosis   Asthma   MDD/PASTORA/mood disorder  Insomnia  Consultations:  IP CONSULT TO CARDIOLOGY  IP CONSULT TO NEUROLOGY  IP CONSULT TO CASE MANAGEMENT      Brief Admission History/Reason for Admission Per Valentin Mccormick, DO:   Juliet Nsah is a 68 y.o.  female with PMHx as listed below presenting to the emergency department with complaints of multiple syncopal/near syncopal events reporting at least 4 episodes in the past 2 weeks resulting in falls with contusion to right shin as well as right side of her face.  Very difficult to clearly delineate what symptoms patient has.  She describes random occurrences occurring both while active (walking/cooking) and at rest stating that she will be doing something and then suddenly find herself in the process of falling stating she is not sure if she is falling out or getting close to falling out.  She denies any preceding symptoms including nausea/vomiting, diaphoresis, chest pain, palpitations, shortness of breath.  She denies any confusion/postictal symptoms.  Denies poor p.o. intake.  Denies recent illness.  Denies medication changes.  Denies an exertional intolerance.  Initially had associated some chest pain with episodes to ED doc, which she subsequently denied for me.  ROS otherwise negative.  Denies tobacco, alcohol, illicit drugs.     In the ED, patient afebrile and hemodynamically stable saturating upper 90s on room air.  ECG demonstrates normal sinus rhythm without definitive ischemic change or significant interval derangement.  CXR negative for acute process.  CT head/C-spine/maxillofacial negative for acute process.  Labs demonstrate:  surgical/procedure care instructions      Follow up with:   PCP : Hang Goldstein PA-C    [facility]  Phone: [phone]  Follow up  This is your home health agency. Please contact them if you have any questions or concerns.    Hang Goldstein PA-C  [address] [facility] [id number] [phone]    Follow up      Hang Goldstein PA-C  [address] [facility] [id number] [phone]          Erica Meza DO  [address] [address] [phone]    Follow up in 1 month(s)      Mark Whalen MD  [address] [address] [phone]    Follow up in 2 week(s)            Total time in minutes spent coordinating this discharge (includes going over instructions, follow-up, prescriptions, and preparing report for sign off to her PCP) :  35 minutes

## 2024-03-16 NOTE — PLAN OF CARE
Problem: Discharge Planning  Goal: Discharge to home or other facility with appropriate resources  3/16/2024 1114 by Luci Obregon RN  Outcome: Completed  3/16/2024 0732 by Luci Obregon RN  Outcome: Progressing  3/15/2024 2234 by Aleisha Jin RN  Outcome: Progressing     Problem: Pain  Goal: Verbalizes/displays adequate comfort level or baseline comfort level  3/16/2024 1114 by Luci Obregon RN  Outcome: Completed  3/16/2024 0732 by Luci Obregon RN  Outcome: Progressing  3/15/2024 2234 by Aleisha Jin RN  Outcome: Progressing     Problem: Safety - Adult  Goal: Free from fall injury  3/16/2024 1114 by Luci Obregon RN  Outcome: Completed  3/16/2024 0732 by Luci Obregon RN  Outcome: Progressing  3/15/2024 2234 by Aleisha Jin RN  Outcome: Progressing     Problem: ABCDS Injury Assessment  Goal: Absence of physical injury  3/16/2024 1114 by Luci Obregon RN  Outcome: Completed  3/16/2024 0732 by Luci Obregon RN  Outcome: Progressing  3/15/2024 2234 by Aleisha Jin RN  Outcome: Progressing     Problem: Neurosensory - Adult  Goal: Achieves maximal functionality and self care  3/16/2024 1114 by Luci Obregon RN  Outcome: Completed  3/16/2024 0732 by Luci Obregon RN  Outcome: Progressing  3/15/2024 2234 by Aleisha Jin RN  Outcome: Progressing     Problem: Cardiovascular - Adult  Goal: Maintains optimal cardiac output and hemodynamic stability  3/16/2024 1114 by Luci Obregon RN  Outcome: Completed  3/16/2024 0732 by Luci Obregon RN  Outcome: Progressing  3/15/2024 2234 by Aleisha Jin RN  Outcome: Progressing     Problem: Hematologic - Adult  Goal: Maintains hematologic stability  3/16/2024 1114 by Luci Obregon RN  Outcome: Completed  3/16/2024 0732 by Luci Obregon RN  Outcome: Progressing  3/15/2024 2234 by Aleisha Jin RN  Outcome: Progressing

## 2024-03-16 NOTE — PROGRESS NOTES
Patient had a normal EEG and brain MRI showing no evidence of active enhancing MS lesions.    Agree with continuing 50 mg of Lamictal in the morning which may be further adjusted in the outpatient setting based on clinical response and side effects, continue home evening dose unchanged.    Patient will speak with her psychiatrist about reducing her Wellbutrin dose to 150 mg a day or eliminating it completely.    She may benefit from outpatient prolonged EEG monitoring if events continue.    No further recommendations from neurology.  She will need to follow-up within 1 to 2 months in the neurology clinic.    Nonbillable note

## 2024-03-16 NOTE — PROGRESS NOTES
End of Shift Note    Bedside shift change report given to Luci RN(oncoming nurse) by Aleisha Jin RN  Report included the following information       Shift worked:  7p - 7a   Shift summary and any significant changes:    No acute concerns     Concerns for physician to address: None   Zone phone for oncoming shift:  1127     Patient Information  Juliet Nash  68 y.o.  3/14/2024  7:23 PM by Valentin Mccormick DO. Juliet Nash was admitted from [facility]    Problem List  Patient Active Problem List    Diagnosis Date Noted    Acute alteration in mental status 03/15/2024    Convulsive syncope 03/15/2024    Syncope and collapse 03/14/2024    Atherosclerosis of native coronary artery of native heart with angina pectoris (HCC)     Bipolar 2 disorder (HCC) 08/22/2022    Mild intermittent asthma without complication 03/04/2021    Hyperlipidemia 03/04/2021    Migraine headache 03/04/2021    GERD (gastroesophageal reflux disease) 10/05/2020    Overweight 09/23/2020    Family history of breast cancer 02/19/2020    Family history of cervical cancer 12/18/2018    Recurrent major depressive disorder, in partial remission (HCC) 12/02/2018    IBS (irritable bowel syndrome) 10/23/2017    Depression 10/23/2017    Anxiety 10/23/2017    Multiple sclerosis, relapsing-remitting (HCC) 10/23/2017     Past Medical History:   Diagnosis Date    Asthma in adult, mild intermittent, uncomplicated     Depression     GERD (gastroesophageal reflux disease)     Hyperlipemia     IBS (irritable bowel syndrome)     Major depressive disorder, recurrent episode, in partial remission (HCC)     Migraine headache     Multiple sclerosis, relapsing-remitting (HCC)     Overweight        Core Measures:  CVA: No:yes      Activity:     Number times ambulated in hallways past shift: 0  Number of times OOB to chair past shift:   Cardiac:   Cardiac Monitoring: y  y    Access:   Current line(s):PIV    Genitourinary:   Urinary status: voiding  continent      Respiratory:   O2 Device: None (Room air)          GI:  Last BM (including prior to admit): 03/15/24  Current diet:  ADULT DIET; Regular; Low Fat/Low Chol/High Fiber/ROBERTO    Tolerating current diet: y       Pain Management:   Patient states pain is manageable on current regimen: n/a    Skin:  Teo Scale Score: 20  Interventions: {    Patient Safety:  Fall Score: Hurst Total Score: 85  Interventions: bed/chair alarm     DVT prophylaxis:  DVT prophylaxis Med-lovenox      Wounds: (If Applicable)  Wounds - Bruises to face and limbs      Active Consults:  IP CONSULT TO CARDIOLOGY  IP CONSULT TO NEUROLOGY  IP CONSULT TO CASE MANAGEMENT    Length of Stay:  Expected LOS: 2  Actual LOS: 1  Discharge Plan:home when testing completed    Aleisha Jin RN

## 2024-03-16 NOTE — PROCEDURES
[facility]              [address] [id number]                                   EEG      PATIENT NAME: JOE DOHERTY                 : 1955  MED REC NO: [id number]                       ROOM: [facility]  ACCOUNT NO: [id number]                       ADMIT DATE: 2024  PROVIDER: Rajesh Otoole MD    DATE OF SERVICE:  03/15/2024    CLINICAL INDICATION:  The patient is a 68-year-old female with a history of possible passing-out spells, possible seizures, possible convulsive syncope, EEG to rule out seizures.    EEG classification on this patient is essentially normal awake and asleep.    DESCRIPTION OF THE RECORD:  This is a 16-channel EEG recording on patient to evaluate for possible seizures.  The patient had a posteriorly located occipital alpha rhythm of 8-9 Hz that did attenuate some with eye opening.  Throughout the recording, there were no clear areas of focal slowing, no clear spike or spike-and-wave discharges and no electrographic or dysrhythmic spells of any type seen.  The patient did enter brief stages of sleep with K complexes and sleep spindles seen in central head regions.  Hyperventilation was not performed.  Photic stimulation produced a minimal driving response in the posterior head regions.      INTERPRETATION:  This is a normal electroencephalogram with the patient awake and asleep, showing no clear areas of focal slowing, no clear spike or spike-and-wave discharges, and no recorded electrographic or dysrhythmic spells seen.        RAJESH OTOOLE MD      TAS/AQS  D:  03/15/2024 17:12:29  T:  03/15/2024 20:22:00  JOB #:  [id number]    CC:   Rajesh Otoole MD

## 2024-03-16 NOTE — DISCHARGE INSTRUCTIONS
Carotidynia: Care Instructions  Overview  Carotidynia is a pain that you feel in your neck or face. It is linked with physical changes that can happen in a carotid artery in your neck. Your neck may feel tender in the area of the artery. The pain often goes up the neck to the jaw, ear, or forehead.  Some diseases can cause carotidynia. Your doctor will check for those. Simple carotidynia may be treated with pain medicine.  Follow-up care is a key part of your treatment and safety. Be sure to make and go to all appointments, and call your doctor if you are having problems. It's also a good idea to know your test results and keep a list of the medicines you take.  How can you care for yourself at home?  Use a cold pack where your neck hurts. This may help decrease pain and swelling. If the problem is near the shoulder or upper back, ice the back of the neck. Do this for 10 to 20 minutes at a time. Put a thin cloth between the ice and your skin. You may also use ice frozen in a foam cup. Be sure not to leave it on your skin too long, or you can get frostbite.  Try heat if ice does not help. To apply heat, put a warm water bottle, a heating pad set on low, or a warm cloth on your neck. Do not go to sleep with a heating pad on your skin.  Take anti-inflammatory medicine to reduce pain and swelling. These include ibuprofen (Advil, Motrin) and naproxen (Aleve). Read and follow all instructions on the label.  When should you call for help?   Call 911 anytime you think you may need emergency care. For example, call if:    You have severe pain in your neck, ear, or head and have numbness on one side of your body.   Call your doctor now or seek immediate medical care if:    Your home treatment does not work.     Your pain gets worse or keeps you from doing what you normally do.     You get numbness or weakness anywhere.     You have a fever.   Watch closely for changes in your health, and be sure to contact your doctor if

## 2024-03-23 NOTE — PROGRESS NOTES
Physician Progress Note      PATIENT:               JOE DOHERTY  CSN #:                  [id number]  :                       1955  ADMIT DATE:       3/14/2024 7:23 PM  DISCH DATE:        3/16/2024 12:17 PM  RESPONDING  PROVIDER #:        James Lubin MD          QUERY TEXT:    68yoF pt admitted with recurrent Syncope with a history of Multiple sclerosis.   Pt noted to have several episodes of loss of consciousness over the last few   months.  EEG was normal & MRI had no enhancing active MS lesions.  Neurology   consult noted 'there is high likelihood these are likely seizure phenomenon.'  After study, can the likely etiology of the transient loss of consciousness   and syncope be further clarified as:    The medical record reflects the following:  Risk Factors: Multiple sclerosis, MDD/PASTORA/mood disorder on Wellbutrin  Clinical Indicators: presents with several episodes of loss of consciousness   over the last few months.  Neurology consult noted 'there is high likelihood these are likely seizure   phenomenon.'  Convulsive syncope noted as problem list by Neurology, Dr AMANDA Otoole.  Treatment: EEG, MRI Brain, Neurology, Psych OP consult reducing Wellbutrin   dose or eliminating completely, Lamictal dose adjusted per Neurology   recommendations.    Thank you,  Maddie Jo RN, CDI  Options provided:  -- recurrent syncope due to Seizure  -- Syncope of unknown etiology  -- Other - I will add my own diagnosis  -- Disagree - Not applicable / Not valid  -- Disagree - Clinically unable to determine / Unknown  -- Refer to Clinical Documentation Reviewer    PROVIDER RESPONSE TEXT:    After study, this patient has recurrent syncope due to possible Seizure POA.    Query created by: Maddie Jo on 3/22/2024 9:18 AM      Electronically signed by:  James Lubin MD 3/23/2024 3:07 AM

## 2024-03-25 ENCOUNTER — OFFICE VISIT (OUTPATIENT)
Facility: CLINIC | Age: 69
End: 2024-03-25

## 2024-03-25 VITALS
TEMPERATURE: 97.6 F | SYSTOLIC BLOOD PRESSURE: 120 MMHG | HEART RATE: 77 BPM | WEIGHT: 175 LBS | RESPIRATION RATE: 16 BRPM | OXYGEN SATURATION: 96 % | HEIGHT: 64 IN | DIASTOLIC BLOOD PRESSURE: 79 MMHG | BODY MASS INDEX: 29.88 KG/M2

## 2024-03-25 DIAGNOSIS — G35 MULTIPLE SCLEROSIS, RELAPSING-REMITTING (HCC): ICD-10-CM

## 2024-03-25 DIAGNOSIS — F31.81 BIPOLAR 2 DISORDER (HCC): ICD-10-CM

## 2024-03-25 DIAGNOSIS — F33.41 RECURRENT MAJOR DEPRESSIVE DISORDER, IN PARTIAL REMISSION (HCC): ICD-10-CM

## 2024-03-25 DIAGNOSIS — I25.119 ATHEROSCLEROSIS OF NATIVE CORONARY ARTERY OF NATIVE HEART WITH ANGINA PECTORIS (HCC): ICD-10-CM

## 2024-03-25 DIAGNOSIS — Z09 HOSPITAL DISCHARGE FOLLOW-UP: Primary | ICD-10-CM

## 2024-03-25 DIAGNOSIS — R55 CONVULSIVE SYNCOPE: ICD-10-CM

## 2024-03-25 ASSESSMENT — PATIENT HEALTH QUESTIONNAIRE - PHQ9
7. TROUBLE CONCENTRATING ON THINGS, SUCH AS READING THE NEWSPAPER OR WATCHING TELEVISION: NOT AT ALL
SUM OF ALL RESPONSES TO PHQ QUESTIONS 1-9: 0
SUM OF ALL RESPONSES TO PHQ QUESTIONS 1-9: 0
10. IF YOU CHECKED OFF ANY PROBLEMS, HOW DIFFICULT HAVE THESE PROBLEMS MADE IT FOR YOU TO DO YOUR WORK, TAKE CARE OF THINGS AT HOME, OR GET ALONG WITH OTHER PEOPLE: NOT DIFFICULT AT ALL
5. POOR APPETITE OR OVEREATING: NOT AT ALL
8. MOVING OR SPEAKING SO SLOWLY THAT OTHER PEOPLE COULD HAVE NOTICED. OR THE OPPOSITE, BEING SO FIGETY OR RESTLESS THAT YOU HAVE BEEN MOVING AROUND A LOT MORE THAN USUAL: NOT AT ALL
4. FEELING TIRED OR HAVING LITTLE ENERGY: NOT AT ALL
SUM OF ALL RESPONSES TO PHQ9 QUESTIONS 1 & 2: 0
1. LITTLE INTEREST OR PLEASURE IN DOING THINGS: NOT AT ALL
2. FEELING DOWN, DEPRESSED OR HOPELESS: NOT AT ALL
3. TROUBLE FALLING OR STAYING ASLEEP: NOT AT ALL
6. FEELING BAD ABOUT YOURSELF - OR THAT YOU ARE A FAILURE OR HAVE LET YOURSELF OR YOUR FAMILY DOWN: NOT AT ALL
9. THOUGHTS THAT YOU WOULD BE BETTER OFF DEAD, OR OF HURTING YOURSELF: NOT AT ALL
SUM OF ALL RESPONSES TO PHQ QUESTIONS 1-9: 0
SUM OF ALL RESPONSES TO PHQ QUESTIONS 1-9: 0

## 2024-03-25 NOTE — PROGRESS NOTES
Juliet Nash  Identified pt with two pt identifiers(name and ).     Chief Complaint   Patient presents with    Follow-Up from Hospital     Room 4B //        Reviewed record In preparation for visit and have obtained necessary documentation.     1. Have you been to the ER, urgent care clinic or hospitalized since your last visit? No     2. Have you seen or consulted any other health care providers outside of the [facility] since your last visit? Include any pap smears or colon screening. No    Patient has an advance directive.     Vitals reviewed with provider.    Health Maintenance reviewed:     Health Maintenance Due   Topic    Respiratory Syncytial Virus (RSV) Pregnant or age 60 yrs+ (1 - 1-dose 60+ series)    Flu vaccine (1)    COVID-19 Vaccine ( season)    Lipids           Wt Readings from Last 3 Encounters:   24 79.4 kg (175 lb)   03/15/24 77.1 kg (170 lb)   23 76.1 kg (167 lb 12.3 oz)        Temp Readings from Last 3 Encounters:   24 98.1 °F (36.7 °C) (Oral)   23 98.2 °F (36.8 °C)   09/15/23 97.5 °F (36.4 °C) (Oral)        BP Readings from Last 3 Encounters:   24 107/67   23 120/81   09/15/23 109/67        Pulse Readings from Last 3 Encounters:   24 80   23 85   09/15/23 67             No data to display                  Learning Assessment:   :         9/15/2023     8:30 AM   [facility] AMB LEARNING ASSESSMENT   Primary Learner Patient   level of education 2 YEARS OF COLLEGE   Barriers Factors NONE   Primary Language ENGLISH   Learning Preference VIDEOS   Answered By Patient   Relationship to Learner SELF         Fall Risk Assessment:         9/15/2023     8:27 AM 2022     8:12 AM 2022     8:01 AM 3/4/2021     8:00 AM   Amb Fall Risk Assessment and TUG Test   Do you feel unsteady or are you worried about falling?  yes      2 or more falls in past year? no      Fall with injury in past year? no      Fall in past 12 months?  0 0 0 
once daily     * lamoTRIgine 200 MG tablet  Commonly known as: LAMICTAL     * lamoTRIgine 25 MG tablet  Commonly known as: LAMICTAL  Take 2 tablets by mouth every morning     Teriflunomide 14 MG Tabs           * This list has 2 medication(s) that are the same as other medications prescribed for you. Read the directions carefully, and ask your doctor or other care provider to review them with you.                    Medications marked \"taking\" at this time  Outpatient Medications Marked as Taking for the 3/25/24 encounter (Office Visit) with Hang Goldstein PA-C   Medication Sig Dispense Refill    lamoTRIgine (LAMICTAL) 25 MG tablet Take 2 tablets by mouth every morning 30 tablet 3    atorvastatin (LIPITOR) 20 MG tablet Take 1 tablet by mouth once daily 90 tablet 3    lamoTRIgine (LAMICTAL) 200 MG tablet Take 1 tablet by mouth at bedtime.      fluticasone (FLONASE) 50 MCG/ACT nasal spray Use 2 spray(s) in each nostril once daily 16 g 5    albuterol sulfate HFA (PROVENTIL;VENTOLIN;PROAIR) 108 (90 Base) MCG/ACT inhaler Inhale 1 puff into the lungs every 6 hours as needed      ALPRAZolam (XANAX) 0.5 MG tablet TAKE 1/2 TO 1 (ONE-HALF TO ONE) TABLET BY MOUTH AS NEEDED PRIOR TO FLYING ON AIRPLANE      aspirin 81 MG EC tablet Take 1 tablet by mouth daily      baclofen (LIORESAL) 10 MG tablet Take 1 tablet by mouth 2 times daily      buPROPion (WELLBUTRIN XL) 300 MG extended release tablet Take 1 tablet by mouth every morning      Calcium Carb-Cholecalciferol 600-20 MG-MCG CHEW Take 1 tablet by mouth daily      citalopram (CELEXA) 20 MG tablet Take 1.5 tablets by mouth daily      Teriflunomide 14 MG TABS Take 1 tablet by mouth daily          Medications patient taking as of now reconciled against medications ordered at time of hospital discharge: Yes    A comprehensive review of systems was negative except for what was noted in the HPI.    Objective:    /79 (Site: Left Upper Arm, Position: Sitting)   Pulse 77

## 2024-04-01 ENCOUNTER — TELEPHONE (OUTPATIENT)
Facility: CLINIC | Age: 69
End: 2024-04-01

## 2024-04-01 NOTE — TELEPHONE ENCOUNTER
Rosemarie with [employer] [phone]  to let Mr Galina know that this patient will be discharged this week and going to stay with son. Patient stated she do not when she will return.

## 2024-09-26 ENCOUNTER — HOSPITAL ENCOUNTER (OUTPATIENT)
Facility: HOSPITAL | Age: 69
Discharge: HOME | End: 2024-09-29
Payer: MEDICARE

## 2024-09-26 VITALS — WEIGHT: 175 LBS | BODY MASS INDEX: 29.88 KG/M2 | HEIGHT: 64 IN

## 2024-09-26 DIAGNOSIS — Z12.31 SCREENING MAMMOGRAM FOR BREAST CANCER: ICD-10-CM

## 2024-09-26 PROCEDURE — 77063 BREAST TOMOSYNTHESIS BI: CPT

## 2024-09-29 SDOH — ECONOMIC STABILITY: FOOD INSECURITY: WITHIN THE PAST 12 MONTHS, YOU WORRIED THAT YOUR FOOD WOULD RUN OUT BEFORE YOU GOT MONEY TO BUY MORE.: NEVER TRUE

## 2024-09-29 SDOH — ECONOMIC STABILITY: TRANSPORTATION INSECURITY
IN THE PAST 12 MONTHS, HAS LACK OF TRANSPORTATION KEPT YOU FROM MEETINGS, WORK, OR FROM GETTING THINGS NEEDED FOR DAILY LIVING?: YES

## 2024-09-29 SDOH — ECONOMIC STABILITY: INCOME INSECURITY: HOW HARD IS IT FOR YOU TO PAY FOR THE VERY BASICS LIKE FOOD, HOUSING, MEDICAL CARE, AND HEATING?: NOT VERY HARD

## 2024-09-29 SDOH — ECONOMIC STABILITY: FOOD INSECURITY: WITHIN THE PAST 12 MONTHS, THE FOOD YOU BOUGHT JUST DIDN'T LAST AND YOU DIDN'T HAVE MONEY TO GET MORE.: NEVER TRUE

## 2024-10-02 ENCOUNTER — OFFICE VISIT (OUTPATIENT)
Facility: CLINIC | Age: 69
End: 2024-10-02

## 2024-10-02 VITALS
RESPIRATION RATE: 16 BRPM | WEIGHT: 179.8 LBS | BODY MASS INDEX: 30.7 KG/M2 | HEIGHT: 64 IN | SYSTOLIC BLOOD PRESSURE: 117 MMHG | HEART RATE: 82 BPM | OXYGEN SATURATION: 94 % | DIASTOLIC BLOOD PRESSURE: 77 MMHG | TEMPERATURE: 98.2 F

## 2024-10-02 DIAGNOSIS — E78.2 MIXED HYPERLIPIDEMIA: ICD-10-CM

## 2024-10-02 DIAGNOSIS — Z23 NEEDS FLU SHOT: ICD-10-CM

## 2024-10-02 DIAGNOSIS — G35 MULTIPLE SCLEROSIS, RELAPSING-REMITTING (HCC): ICD-10-CM

## 2024-10-02 DIAGNOSIS — J45.20 MILD INTERMITTENT ASTHMA WITHOUT COMPLICATION: ICD-10-CM

## 2024-10-02 DIAGNOSIS — E83.52 HYPERCALCEMIA: ICD-10-CM

## 2024-10-02 DIAGNOSIS — F31.81 BIPOLAR 2 DISORDER (HCC): ICD-10-CM

## 2024-10-02 DIAGNOSIS — Z00.00 ENCOUNTER FOR SUBSEQUENT ANNUAL WELLNESS VISIT (AWV) IN MEDICARE PATIENT: Primary | ICD-10-CM

## 2024-10-02 DIAGNOSIS — F33.41 RECURRENT MAJOR DEPRESSIVE DISORDER, IN PARTIAL REMISSION (HCC): ICD-10-CM

## 2024-10-02 DIAGNOSIS — R11.0 NAUSEA: ICD-10-CM

## 2024-10-02 DIAGNOSIS — G60.9 IDIOPATHIC PERIPHERAL NEUROPATHY: ICD-10-CM

## 2024-10-02 DIAGNOSIS — I25.119 ATHEROSCLEROSIS OF NATIVE CORONARY ARTERY OF NATIVE HEART WITH ANGINA PECTORIS (HCC): ICD-10-CM

## 2024-10-02 DIAGNOSIS — F41.9 ANXIETY: ICD-10-CM

## 2024-10-02 LAB
ALBUMIN SERPL-MCNC: 3.8 G/DL (ref 3.5–5)
ALBUMIN/GLOB SERPL: 1.3 (ref 1.1–2.2)
ALP SERPL-CCNC: 119 U/L (ref 45–117)
ALT SERPL-CCNC: 25 U/L (ref 12–78)
ANION GAP SERPL CALC-SCNC: 2 MMOL/L (ref 2–12)
AST SERPL-CCNC: 16 U/L (ref 15–37)
BASOPHILS # BLD: 0.1 K/UL (ref 0–0.1)
BASOPHILS NFR BLD: 1 % (ref 0–1)
BILIRUB SERPL-MCNC: 0.4 MG/DL (ref 0.2–1)
BUN SERPL-MCNC: 10 MG/DL (ref 6–20)
BUN/CREAT SERPL: 15 (ref 12–20)
CALCIUM SERPL-MCNC: 10.2 MG/DL (ref 8.5–10.1)
CHLORIDE SERPL-SCNC: 108 MMOL/L (ref 97–108)
CHOLEST SERPL-MCNC: 196 MG/DL
CO2 SERPL-SCNC: 29 MMOL/L (ref 21–32)
CREAT SERPL-MCNC: 0.68 MG/DL (ref 0.55–1.02)
DIFFERENTIAL METHOD BLD: NORMAL
EOSINOPHIL # BLD: 0.2 K/UL (ref 0–0.4)
EOSINOPHIL NFR BLD: 3 % (ref 0–7)
ERYTHROCYTE [DISTWIDTH] IN BLOOD BY AUTOMATED COUNT: 12.7 % (ref 11.5–14.5)
GLOBULIN SER CALC-MCNC: 3 G/DL (ref 2–4)
GLUCOSE SERPL-MCNC: 75 MG/DL (ref 65–100)
HCT VFR BLD AUTO: 43.4 % (ref 35–47)
HDLC SERPL-MCNC: 67 MG/DL
HDLC SERPL: 2.9 (ref 0–5)
HGB BLD-MCNC: 14 G/DL (ref 11.5–16)
IMM GRANULOCYTES # BLD AUTO: 0 K/UL (ref 0–0.04)
IMM GRANULOCYTES NFR BLD AUTO: 0 % (ref 0–0.5)
LDLC SERPL CALC-MCNC: 105.4 MG/DL (ref 0–100)
LYMPHOCYTES # BLD: 2 K/UL (ref 0.8–3.5)
LYMPHOCYTES NFR BLD: 33 % (ref 12–49)
MCH RBC QN AUTO: 29.9 PG (ref 26–34)
MCHC RBC AUTO-ENTMCNC: 32.3 G/DL (ref 30–36.5)
MCV RBC AUTO: 92.5 FL (ref 80–99)
MONOCYTES # BLD: 0.6 K/UL (ref 0–1)
MONOCYTES NFR BLD: 11 % (ref 5–13)
NEUTS SEG # BLD: 3.1 K/UL (ref 1.8–8)
NEUTS SEG NFR BLD: 52 % (ref 32–75)
NRBC # BLD: 0 K/UL (ref 0–0.01)
NRBC BLD-RTO: 0 PER 100 WBC
PLATELET # BLD AUTO: 230 K/UL (ref 150–400)
PMV BLD AUTO: 9.7 FL (ref 8.9–12.9)
POTASSIUM SERPL-SCNC: 4.3 MMOL/L (ref 3.5–5.1)
PROT SERPL-MCNC: 6.8 G/DL (ref 6.4–8.2)
RBC # BLD AUTO: 4.69 M/UL (ref 3.8–5.2)
SODIUM SERPL-SCNC: 139 MMOL/L (ref 136–145)
TRIGL SERPL-MCNC: 118 MG/DL
VLDLC SERPL CALC-MCNC: 23.6 MG/DL
WBC # BLD AUTO: 6 K/UL (ref 3.6–11)

## 2024-10-02 RX ORDER — ROTIGOTINE 4 MG/24H
1 PATCH, EXTENDED RELEASE TRANSDERMAL DAILY
COMMUNITY
Start: 2024-09-23

## 2024-10-02 RX ORDER — OMEPRAZOLE 40 MG/1
40 CAPSULE, DELAYED RELEASE ORAL
Qty: 30 CAPSULE | Refills: 5 | Status: SHIPPED | OUTPATIENT
Start: 2024-10-02

## 2024-10-02 RX ORDER — PROMETHAZINE HYDROCHLORIDE 25 MG/1
25 TABLET ORAL 4 TIMES DAILY PRN
Qty: 20 TABLET | Refills: 0 | Status: SHIPPED | OUTPATIENT
Start: 2024-10-02 | End: 2024-10-09

## 2024-10-02 SDOH — ECONOMIC STABILITY: FOOD INSECURITY: WITHIN THE PAST 12 MONTHS, YOU WORRIED THAT YOUR FOOD WOULD RUN OUT BEFORE YOU GOT MONEY TO BUY MORE.: NEVER TRUE

## 2024-10-02 SDOH — ECONOMIC STABILITY: FOOD INSECURITY: WITHIN THE PAST 12 MONTHS, THE FOOD YOU BOUGHT JUST DIDN'T LAST AND YOU DIDN'T HAVE MONEY TO GET MORE.: NEVER TRUE

## 2024-10-02 SDOH — HEALTH STABILITY: PHYSICAL HEALTH: ON AVERAGE, HOW MANY DAYS PER WEEK DO YOU ENGAGE IN MODERATE TO STRENUOUS EXERCISE (LIKE A BRISK WALK)?: 0 DAYS

## 2024-10-02 SDOH — ECONOMIC STABILITY: INCOME INSECURITY: HOW HARD IS IT FOR YOU TO PAY FOR THE VERY BASICS LIKE FOOD, HOUSING, MEDICAL CARE, AND HEATING?: NOT HARD AT ALL

## 2024-10-02 SDOH — HEALTH STABILITY: PHYSICAL HEALTH: ON AVERAGE, HOW MANY MINUTES DO YOU ENGAGE IN EXERCISE AT THIS LEVEL?: 10 MIN

## 2024-10-02 ASSESSMENT — PATIENT HEALTH QUESTIONNAIRE - PHQ9
9. THOUGHTS THAT YOU WOULD BE BETTER OFF DEAD, OR OF HURTING YOURSELF: NOT AT ALL
3. TROUBLE FALLING OR STAYING ASLEEP: SEVERAL DAYS
10. IF YOU CHECKED OFF ANY PROBLEMS, HOW DIFFICULT HAVE THESE PROBLEMS MADE IT FOR YOU TO DO YOUR WORK, TAKE CARE OF THINGS AT HOME, OR GET ALONG WITH OTHER PEOPLE: VERY DIFFICULT
1. LITTLE INTEREST OR PLEASURE IN DOING THINGS: SEVERAL DAYS
SUM OF ALL RESPONSES TO PHQ QUESTIONS 1-9: 10
SUM OF ALL RESPONSES TO PHQ9 QUESTIONS 1 & 2: 2
6. FEELING BAD ABOUT YOURSELF - OR THAT YOU ARE A FAILURE OR HAVE LET YOURSELF OR YOUR FAMILY DOWN: SEVERAL DAYS
SUM OF ALL RESPONSES TO PHQ QUESTIONS 1-9: 10
SUM OF ALL RESPONSES TO PHQ QUESTIONS 1-9: 10
8. MOVING OR SPEAKING SO SLOWLY THAT OTHER PEOPLE COULD HAVE NOTICED. OR THE OPPOSITE, BEING SO FIGETY OR RESTLESS THAT YOU HAVE BEEN MOVING AROUND A LOT MORE THAN USUAL: SEVERAL DAYS
SUM OF ALL RESPONSES TO PHQ QUESTIONS 1-9: 10
5. POOR APPETITE OR OVEREATING: SEVERAL DAYS
7. TROUBLE CONCENTRATING ON THINGS, SUCH AS READING THE NEWSPAPER OR WATCHING TELEVISION: SEVERAL DAYS
4. FEELING TIRED OR HAVING LITTLE ENERGY: NEARLY EVERY DAY
2. FEELING DOWN, DEPRESSED OR HOPELESS: SEVERAL DAYS

## 2024-10-02 ASSESSMENT — LIFESTYLE VARIABLES
HOW OFTEN DO YOU HAVE A DRINK CONTAINING ALCOHOL: 1
HOW OFTEN DO YOU HAVE SIX OR MORE DRINKS ON ONE OCCASION: 1
HOW MANY STANDARD DRINKS CONTAINING ALCOHOL DO YOU HAVE ON A TYPICAL DAY: 0
HOW OFTEN DO YOU HAVE A DRINK CONTAINING ALCOHOL: NEVER
HOW MANY STANDARD DRINKS CONTAINING ALCOHOL DO YOU HAVE ON A TYPICAL DAY: PATIENT DOES NOT DRINK

## 2024-10-02 NOTE — PROGRESS NOTES
Juliet Nash  Identified pt with two pt identifiers(name and ).     Chief Complaint   Patient presents with    Medicare AWV     Room 4B //        Reviewed record In preparation for visit and have obtained necessary documentation.     1. Have you been to the ER, urgent care clinic or hospitalized since your last visit? No     2. Have you seen or consulted any other health care providers outside of the [facility] since your last visit? Include any pap smears or colon screening. No    Patient does not have an advance directive.     Vitals reviewed with provider.    Health Maintenance reviewed:     After verbal order read back of Hang Goldstein PA-C, patient received High Dose Flu Shot (Adjuvanted Fluad) in left deltoid.  NDC:  93831-646-46 Lot: [id number]  Exp: 2025.  Patient tolerated procedure without complaints and received VIS.    Health Maintenance Due   Topic    Respiratory Syncytial Virus (RSV) Pregnant or age 60 yrs+ (1 - 1-dose 60+ series)    Lipids     Flu vaccine (1)    COVID-19 Vaccine ( season)    Annual Wellness Visit (Medicare)           Wt Readings from Last 3 Encounters:   24 79.4 kg (175 lb)   24 79.4 kg (175 lb)   03/15/24 77.1 kg (170 lb)        Temp Readings from Last 3 Encounters:   24 97.6 °F (36.4 °C) (Oral)   24 98.1 °F (36.7 °C) (Oral)   23 98.2 °F (36.8 °C)        BP Readings from Last 3 Encounters:   24 120/79   24 107/67   23 120/81        Pulse Readings from Last 3 Encounters:   24 77   24 80   23 85             No data to display                  Learning Assessment:         9/15/2023     8:30 AM   [facility] AMB LEARNING ASSESSMENT   Primary Learner Patient   level of education 2 YEARS OF COLLEGE   Barriers Factors NONE   Primary Language ENGLISH   Learning Preference VIDEOS   Answered By Patient   Relationship to Learner SELF         Fall Risk Assessment:   :         10/2/2024     8:07 AM 
Josue and repeat sleep study, did not have daytime somnulence test run at [place] providers discretion, though it had insurance approval.    Her MS is stable and she continues to do well outside of weakness in legs and fatigue. She would like to do PT for neuropathy in legs and balance and has some new curling of her toes which she relates to neuropathy    She had her Neupro patch increased to 4mg for RLS/sleep by neuro.    She reports additionally intermittent nausea which she relates to new abnormal smell in her apartment. She is noticed it since moving back in and has had it checked out by apartment complex x 3 and had coils in AC unit cleaned. Noticing it more with rain. Will have plumbing and water lines looked [facility]    Bipolar depression stable. Has good friend living next door and is stable on lamictal 200 mg at bed time and wellbutrin 300 mg daily and celexa 20 mg daily.     Has also been having few episodes of cramping like chest pains in central chest. Saw Cardiology and deemed non-cardiac. Some SOB, they deemed she needed to see pulmonology. Has hx of Heart attack and takes baby asa daily and statin     Patient's complete Health Risk Assessment and screening values have been reviewed and are found in Flowsheets. The following problems were reviewed today and where indicated follow up appointments were made and/or referrals ordered.    Positive Risk Factor Screenings with Interventions:    Fall Risk:  Do you feel unsteady or are you worried about falling? : (!) yes  2 or more falls in past year?: (!) yes  Fall with injury in past year?: (!) yes     Interventions:    Reviewed medications, home hazards, visual acuity, and co-morbidities that can increase risk for falls  Referral: Physical Therapy (PT)     Depression:  PHQ-2 Score: 2  PHQ-9 Total Score: 10  Total Score Interpretation: 10-14 = moderate depression  Interventions:  Monitored by specialist. No acute findings meriting change

## 2024-10-08 LAB — 25(OH)D3 SERPL-MCNC: 33.3 NG/ML (ref 30–100)

## 2024-10-09 RX ORDER — ATORVASTATIN CALCIUM 20 MG/1
20 TABLET, FILM COATED ORAL DAILY
Qty: 90 TABLET | Refills: 3 | Status: SHIPPED | OUTPATIENT
Start: 2024-10-09

## 2024-10-09 NOTE — TELEPHONE ENCOUNTER
PCP: Hang Goldstein PA-C     Last appt:  10/2/2024      Future Appointments   Date Time Provider Department Center   4/2/2025  8:00 AM Hang Goldstein PA-C [facility] DE[facility]          Requested Prescriptions     Pending Prescriptions Disp Refills    atorvastatin (LIPITOR) 20 MG tablet 90 tablet 3     Sig: Take 1 tablet by mouth daily

## 2024-11-01 ENCOUNTER — HOSPITAL ENCOUNTER (OUTPATIENT)
Facility: HOSPITAL | Age: 69
Discharge: HOME | End: 2024-11-04
Attending: INTERNAL MEDICINE
Payer: MEDICARE

## 2024-11-01 DIAGNOSIS — R06.09 OTHER FORM OF DYSPNEA: ICD-10-CM

## 2024-11-01 PROCEDURE — 71250 CT THORAX DX C-: CPT

## 2025-03-20 PROBLEM — J44.89 ASTHMA-COPD OVERLAP SYNDROME (HCC): Status: ACTIVE | Noted: 2025-03-20

## 2025-03-30 SDOH — ECONOMIC STABILITY: FOOD INSECURITY: WITHIN THE PAST 12 MONTHS, YOU WORRIED THAT YOUR FOOD WOULD RUN OUT BEFORE YOU GOT MONEY TO BUY MORE.: NEVER TRUE

## 2025-03-30 SDOH — ECONOMIC STABILITY: INCOME INSECURITY: IN THE LAST 12 MONTHS, WAS THERE A TIME WHEN YOU WERE NOT ABLE TO PAY THE MORTGAGE OR RENT ON TIME?: NO

## 2025-03-30 SDOH — ECONOMIC STABILITY: FOOD INSECURITY: WITHIN THE PAST 12 MONTHS, THE FOOD YOU BOUGHT JUST DIDN'T LAST AND YOU DIDN'T HAVE MONEY TO GET MORE.: NEVER TRUE

## 2025-03-30 SDOH — ECONOMIC STABILITY: TRANSPORTATION INSECURITY
IN THE PAST 12 MONTHS, HAS THE LACK OF TRANSPORTATION KEPT YOU FROM MEDICAL APPOINTMENTS OR FROM GETTING MEDICATIONS?: NO

## 2025-03-30 SDOH — ECONOMIC STABILITY: TRANSPORTATION INSECURITY
IN THE PAST 12 MONTHS, HAS LACK OF TRANSPORTATION KEPT YOU FROM MEETINGS, WORK, OR FROM GETTING THINGS NEEDED FOR DAILY LIVING?: NO

## 2025-04-02 ENCOUNTER — OFFICE VISIT (OUTPATIENT)
Facility: CLINIC | Age: 70
End: 2025-04-02
Payer: MEDICARE

## 2025-04-02 VITALS
DIASTOLIC BLOOD PRESSURE: 76 MMHG | RESPIRATION RATE: 16 BRPM | BODY MASS INDEX: 31.51 KG/M2 | OXYGEN SATURATION: 91 % | HEIGHT: 64 IN | TEMPERATURE: 98.2 F | WEIGHT: 184.6 LBS | HEART RATE: 66 BPM | SYSTOLIC BLOOD PRESSURE: 120 MMHG

## 2025-04-02 DIAGNOSIS — Z80.3 FAMILY HISTORY OF BREAST CANCER: ICD-10-CM

## 2025-04-02 DIAGNOSIS — Z80.49 FAMILY HISTORY OF CERVICAL CANCER: ICD-10-CM

## 2025-04-02 DIAGNOSIS — J44.89 ASTHMA-COPD OVERLAP SYNDROME (HCC): ICD-10-CM

## 2025-04-02 DIAGNOSIS — I25.119 ATHEROSCLEROSIS OF NATIVE CORONARY ARTERY OF NATIVE HEART WITH ANGINA PECTORIS: Primary | ICD-10-CM

## 2025-04-02 DIAGNOSIS — E78.2 MIXED HYPERLIPIDEMIA: ICD-10-CM

## 2025-04-02 DIAGNOSIS — G35 MULTIPLE SCLEROSIS, RELAPSING-REMITTING (HCC): ICD-10-CM

## 2025-04-02 DIAGNOSIS — F31.81 BIPOLAR 2 DISORDER (HCC): ICD-10-CM

## 2025-04-02 PROCEDURE — 1090F PRES/ABSN URINE INCON ASSESS: CPT | Performed by: PHYSICIAN ASSISTANT

## 2025-04-02 PROCEDURE — G8417 CALC BMI ABV UP PARAM F/U: HCPCS | Performed by: PHYSICIAN ASSISTANT

## 2025-04-02 PROCEDURE — 1036F TOBACCO NON-USER: CPT | Performed by: PHYSICIAN ASSISTANT

## 2025-04-02 PROCEDURE — 3023F SPIROM DOC REV: CPT | Performed by: PHYSICIAN ASSISTANT

## 2025-04-02 PROCEDURE — 1123F ACP DISCUSS/DSCN MKR DOCD: CPT | Performed by: PHYSICIAN ASSISTANT

## 2025-04-02 PROCEDURE — 1160F RVW MEDS BY RX/DR IN RCRD: CPT | Performed by: PHYSICIAN ASSISTANT

## 2025-04-02 PROCEDURE — 1159F MED LIST DOCD IN RCRD: CPT | Performed by: PHYSICIAN ASSISTANT

## 2025-04-02 PROCEDURE — 3017F COLORECTAL CA SCREEN DOC REV: CPT | Performed by: PHYSICIAN ASSISTANT

## 2025-04-02 PROCEDURE — 99214 OFFICE O/P EST MOD 30 MIN: CPT | Performed by: PHYSICIAN ASSISTANT

## 2025-04-02 PROCEDURE — G8427 DOCREV CUR MEDS BY ELIG CLIN: HCPCS | Performed by: PHYSICIAN ASSISTANT

## 2025-04-02 PROCEDURE — G8399 PT W/DXA RESULTS DOCUMENT: HCPCS | Performed by: PHYSICIAN ASSISTANT

## 2025-04-02 PROCEDURE — 1126F AMNT PAIN NOTED NONE PRSNT: CPT | Performed by: PHYSICIAN ASSISTANT

## 2025-04-02 RX ORDER — METOPROLOL SUCCINATE 25 MG/1
25 TABLET, EXTENDED RELEASE ORAL DAILY
COMMUNITY
Start: 2025-02-15

## 2025-04-02 RX ORDER — MOMETASONE FUROATE AND FORMOTEROL FUMARATE DIHYDRATE 100; 5 UG/1; UG/1
2 AEROSOL RESPIRATORY (INHALATION) 2 TIMES DAILY
COMMUNITY

## 2025-04-02 RX ORDER — DULOXETIN HYDROCHLORIDE 30 MG/1
60 CAPSULE, DELAYED RELEASE ORAL DAILY
COMMUNITY
Start: 2025-01-08

## 2025-04-02 RX ORDER — FEXOFENADINE HCL 180 MG/1
180 TABLET ORAL DAILY
Qty: 30 TABLET | Refills: 5 | Status: SHIPPED | OUTPATIENT
Start: 2025-04-02

## 2025-04-02 RX ORDER — FLUTICASONE PROPIONATE 50 MCG
2 SPRAY, SUSPENSION (ML) NASAL DAILY
Qty: 16 G | Refills: 5 | Status: SHIPPED | OUTPATIENT
Start: 2025-04-02

## 2025-04-02 RX ORDER — OMEPRAZOLE 40 MG/1
40 CAPSULE, DELAYED RELEASE ORAL
Qty: 30 CAPSULE | Refills: 5 | Status: SHIPPED | OUTPATIENT
Start: 2025-04-02 | End: 2025-04-02

## 2025-04-02 RX ORDER — ISOSORBIDE MONONITRATE 30 MG/1
30 TABLET, EXTENDED RELEASE ORAL DAILY
COMMUNITY
Start: 2025-01-23 | End: 2026-01-23

## 2025-04-02 RX ORDER — ATORVASTATIN CALCIUM 80 MG/1
80 TABLET, FILM COATED ORAL DAILY
COMMUNITY
Start: 2025-01-24

## 2025-04-02 RX ORDER — MULTIVIT-MIN/IRON/FOLIC ACID/K 18-600-40
2000 CAPSULE ORAL DAILY
COMMUNITY

## 2025-04-02 RX ORDER — OMEPRAZOLE 40 MG/1
40 CAPSULE, DELAYED RELEASE ORAL
Qty: 30 CAPSULE | Refills: 5 | Status: SHIPPED | OUTPATIENT
Start: 2025-04-02

## 2025-04-02 RX ORDER — BUPROPION HYDROCHLORIDE 150 MG/1
150 TABLET ORAL EVERY MORNING
COMMUNITY

## 2025-04-02 RX ORDER — NITROGLYCERIN 0.4 MG/1
0.4 TABLET SUBLINGUAL EVERY 5 MIN PRN
COMMUNITY

## 2025-04-02 ASSESSMENT — ENCOUNTER SYMPTOMS
DIARRHEA: 0
NAUSEA: 0
ABDOMINAL PAIN: 0
RESPIRATORY NEGATIVE: 1
VOMITING: 0
BLOOD IN STOOL: 0
SHORTNESS OF BREATH: 0
CONSTIPATION: 0
EYES NEGATIVE: 1

## 2025-04-02 ASSESSMENT — PATIENT HEALTH QUESTIONNAIRE - PHQ9
6. FEELING BAD ABOUT YOURSELF - OR THAT YOU ARE A FAILURE OR HAVE LET YOURSELF OR YOUR FAMILY DOWN: NEARLY EVERY DAY
2. FEELING DOWN, DEPRESSED OR HOPELESS: NEARLY EVERY DAY
7. TROUBLE CONCENTRATING ON THINGS, SUCH AS READING THE NEWSPAPER OR WATCHING TELEVISION: NEARLY EVERY DAY
SUM OF ALL RESPONSES TO PHQ QUESTIONS 1-9: 22
1. LITTLE INTEREST OR PLEASURE IN DOING THINGS: NEARLY EVERY DAY
10. IF YOU CHECKED OFF ANY PROBLEMS, HOW DIFFICULT HAVE THESE PROBLEMS MADE IT FOR YOU TO DO YOUR WORK, TAKE CARE OF THINGS AT HOME, OR GET ALONG WITH OTHER PEOPLE: EXTREMELY DIFFICULT
9. THOUGHTS THAT YOU WOULD BE BETTER OFF DEAD, OR OF HURTING YOURSELF: NOT AT ALL
SUM OF ALL RESPONSES TO PHQ QUESTIONS 1-9: 22
3. TROUBLE FALLING OR STAYING ASLEEP: NEARLY EVERY DAY
4. FEELING TIRED OR HAVING LITTLE ENERGY: NEARLY EVERY DAY
8. MOVING OR SPEAKING SO SLOWLY THAT OTHER PEOPLE COULD HAVE NOTICED. OR THE OPPOSITE, BEING SO FIGETY OR RESTLESS THAT YOU HAVE BEEN MOVING AROUND A LOT MORE THAN USUAL: SEVERAL DAYS
5. POOR APPETITE OR OVEREATING: NEARLY EVERY DAY
SUM OF ALL RESPONSES TO PHQ QUESTIONS 1-9: 22
SUM OF ALL RESPONSES TO PHQ QUESTIONS 1-9: 22

## 2025-04-02 NOTE — PROGRESS NOTES
Juliet Nash  Identified pt with two pt identifiers(name and ).     Chief Complaint   Patient presents with    Asthma     Room 4A //     Depression    Cholesterol Problem       Reviewed record In preparation for visit and have obtained necessary documentation.     1. Have you been to the ER, urgent care clinic or hospitalized since your last visit? No     2. Have you seen or consulted any other health care providers outside of the [facility] since your last visit? Include any pap smears or colon screening. No    Patient has an advance directive.     Vitals reviewed with provider.    Health Maintenance reviewed:     There are no preventive care reminders to display for this patient.       Wt Readings from Last 3 Encounters:   25 83.7 kg (184 lb 9.6 oz)   24 79.4 kg (175 lb)   10/02/24 81.6 kg (179 lb 12.8 oz)        Temp Readings from Last 3 Encounters:   10/02/24 98.2 °F (36.8 °C) (Oral)   24 97.6 °F (36.4 °C) (Oral)        BP Readings from Last 3 Encounters:   10/02/24 117/77   24 120/79   24 107/67        Pulse Readings from Last 3 Encounters:   10/02/24 82   24 77   24 80             No data to display                  Learning Assessment:   :         9/15/2023     8:30 AM   [facility] AMB LEARNING ASSESSMENT   Primary Learner Patient   level of education 2 YEARS OF COLLEGE   Barriers Factors NONE   Primary Language ENGLISH   Learning Preference VIDEOS   Answered By Patient   Relationship to Learner SELF         Fall Risk Assessment:         10/2/2024     8:07 AM 9/15/2023     8:27 AM 2022     8:12 AM 2022     8:01 AM 3/4/2021     8:00 AM   Amb Fall Risk Assessment and TUG Test   Do you feel unsteady or are you worried about falling?  yes yes      2 or more falls in past year? yes no      Fall with injury in past year? yes no      Fall in past 12 months?   0 0 0   Able to walk?   Yes Yes Yes         Abuse Screenin/15/2023     8:00 AM

## 2025-04-02 NOTE — PROGRESS NOTES
Juliet Nash is a 69 y.o. year old female seen in clinic today for   Chief Complaint   Patient presents with    Asthma     Room 4A //     Depression    Cholesterol Problem       she is here today to follow up for COPD/Asthma, Depression, CAD, HLD  Her BP looks good today. Saw new cardiologist at [facility] and is feeling good about current treatment. He put her on isosorbide for angina and she was put on Dulera by new Pulmonologist. Her shortness of breath has improved markedly. Feels she needs to be on something for allergies currently. Has some R ear swishing.    Now seeing new psychiatrist in area and had meds adjusted, has tough times in these months with anniversary of husbands death and birthday. Now on cymbalta 60 mg and 150 mg wellbutrin. Still working with therapist and is very pleased with this .    Now seeing new Neurologist in [facility]. No treatment changes but no flare ups. Doing better with PT for balance and leg strengthening. Very pleased with [facility] in [place].     she specifically denies any CP, SOB, HA. Dizziness, fevers, chills, N/V/D, urinary symptoms or other bowel changes.    Current Outpatient Medications on File Prior to Visit   Medication Sig Dispense Refill    DULERA 100-5 MCG/ACT inhaler Inhale 2 puffs into the lungs 2 times daily      DULoxetine (CYMBALTA) 30 MG extended release capsule Take 2 capsules by mouth daily      isosorbide mononitrate (IMDUR) 30 MG extended release tablet Take 1 tablet by mouth daily      metoprolol succinate (TOPROL XL) 25 MG extended release tablet Take 1 tablet by mouth daily      nitroGLYCERIN (NITROSTAT) 0.4 MG SL tablet Place 1 tablet under the tongue every 5 minutes as needed for Chest pain      atorvastatin (LIPITOR) 80 MG tablet Take 1 tablet by mouth daily      vitamin D 50 MCG (2000 UT) CAPS capsule Take 1 capsule by mouth daily      buPROPion (WELLBUTRIN XL) 150 MG extended release tablet Take 1 tablet by mouth every morning      NEUPRO 4 MG/24HR Place 1 patch

## 2025-07-19 LAB
ALBUMIN SERPL-MCNC: 3.8 G/DL (ref 3.5–5)
ALBUMIN/GLOB SERPL: 1.5 (ref 1.1–2.2)
ALP SERPL-CCNC: 108 U/L (ref 45–117)
ALT SERPL-CCNC: 27 U/L (ref 12–78)
ANION GAP SERPL CALC-SCNC: 3 MMOL/L (ref 2–12)
AST SERPL-CCNC: 20 U/L (ref 15–37)
BILIRUB SERPL-MCNC: 0.4 MG/DL (ref 0.2–1)
BUN SERPL-MCNC: 11 MG/DL (ref 6–20)
BUN/CREAT SERPL: 15 (ref 12–20)
CALCIUM SERPL-MCNC: 10.1 MG/DL (ref 8.5–10.1)
CHLORIDE SERPL-SCNC: 109 MMOL/L (ref 97–108)
CHOLEST SERPL-MCNC: 143 MG/DL
CO2 SERPL-SCNC: 28 MMOL/L (ref 21–32)
CREAT SERPL-MCNC: 0.75 MG/DL (ref 0.55–1.02)
GLOBULIN SER CALC-MCNC: 2.6 G/DL (ref 2–4)
GLUCOSE SERPL-MCNC: 96 MG/DL (ref 65–100)
HDLC SERPL-MCNC: 56 MG/DL
HDLC SERPL: 2.6 (ref 0–5)
LDLC SERPL CALC-MCNC: 67.8 MG/DL (ref 0–100)
POTASSIUM SERPL-SCNC: 4 MMOL/L (ref 3.5–5.1)
PROT SERPL-MCNC: 6.4 G/DL (ref 6.4–8.2)
SODIUM SERPL-SCNC: 140 MMOL/L (ref 136–145)
TRIGL SERPL-MCNC: 96 MG/DL
VLDLC SERPL CALC-MCNC: 19.2 MG/DL

## 2025-08-28 ENCOUNTER — TELEPHONE (OUTPATIENT)
Facility: CLINIC | Age: 70
End: 2025-08-28

## 2025-08-29 ENCOUNTER — OFFICE VISIT (OUTPATIENT)
Facility: CLINIC | Age: 70
End: 2025-08-29
Payer: MEDICARE

## 2025-08-29 VITALS
DIASTOLIC BLOOD PRESSURE: 71 MMHG | BODY MASS INDEX: 30.97 KG/M2 | SYSTOLIC BLOOD PRESSURE: 106 MMHG | TEMPERATURE: 97.8 F | HEIGHT: 64 IN | HEART RATE: 71 BPM | OXYGEN SATURATION: 94 % | WEIGHT: 181.4 LBS | RESPIRATION RATE: 16 BRPM

## 2025-08-29 DIAGNOSIS — M23.91 DERANGEMENT OF RIGHT KNEE: Primary | ICD-10-CM

## 2025-08-29 PROCEDURE — 3017F COLORECTAL CA SCREEN DOC REV: CPT | Performed by: PHYSICIAN ASSISTANT

## 2025-08-29 PROCEDURE — 1160F RVW MEDS BY RX/DR IN RCRD: CPT | Performed by: PHYSICIAN ASSISTANT

## 2025-08-29 PROCEDURE — 1090F PRES/ABSN URINE INCON ASSESS: CPT | Performed by: PHYSICIAN ASSISTANT

## 2025-08-29 PROCEDURE — 1123F ACP DISCUSS/DSCN MKR DOCD: CPT | Performed by: PHYSICIAN ASSISTANT

## 2025-08-29 PROCEDURE — G8417 CALC BMI ABV UP PARAM F/U: HCPCS | Performed by: PHYSICIAN ASSISTANT

## 2025-08-29 PROCEDURE — G8427 DOCREV CUR MEDS BY ELIG CLIN: HCPCS | Performed by: PHYSICIAN ASSISTANT

## 2025-08-29 PROCEDURE — 1126F AMNT PAIN NOTED NONE PRSNT: CPT | Performed by: PHYSICIAN ASSISTANT

## 2025-08-29 PROCEDURE — 1159F MED LIST DOCD IN RCRD: CPT | Performed by: PHYSICIAN ASSISTANT

## 2025-08-29 PROCEDURE — 1036F TOBACCO NON-USER: CPT | Performed by: PHYSICIAN ASSISTANT

## 2025-08-29 PROCEDURE — 99213 OFFICE O/P EST LOW 20 MIN: CPT | Performed by: PHYSICIAN ASSISTANT

## 2025-08-29 PROCEDURE — G8399 PT W/DXA RESULTS DOCUMENT: HCPCS | Performed by: PHYSICIAN ASSISTANT

## 2025-08-29 RX ORDER — DICLOFENAC SODIUM 75 MG/1
75 TABLET, DELAYED RELEASE ORAL 2 TIMES DAILY WITH MEALS
Qty: 60 TABLET | Refills: 0 | Status: SHIPPED | OUTPATIENT
Start: 2025-08-29

## 2025-08-29 RX ORDER — LAMOTRIGINE 200 MG/1
200 TABLET, EXTENDED RELEASE ORAL DAILY
COMMUNITY
Start: 2025-06-24

## 2025-08-29 ASSESSMENT — ENCOUNTER SYMPTOMS
EYES NEGATIVE: 1
GASTROINTESTINAL NEGATIVE: 1
RESPIRATORY NEGATIVE: 1
BACK PAIN: 0
SHORTNESS OF BREATH: 0